# Patient Record
Sex: MALE | Race: WHITE
[De-identification: names, ages, dates, MRNs, and addresses within clinical notes are randomized per-mention and may not be internally consistent; named-entity substitution may affect disease eponyms.]

---

## 2018-01-01 ENCOUNTER — HOSPITAL (OUTPATIENT)
Dept: OTHER | Age: 78
End: 2018-01-01
Attending: FAMILY MEDICINE

## 2018-01-01 LAB
ALBUMIN SERPL-MCNC: 3.8 GM/DL (ref 3.6–5.1)
ALBUMIN/GLOB SERPL: 1.1 {RATIO} (ref 1–2.4)
ALP SERPL-CCNC: 60 UNIT/L (ref 45–117)
ALT SERPL-CCNC: 57 UNIT/L
ANALYZER ANC (IANC): ABNORMAL
ANION GAP SERPL CALC-SCNC: 13 MMOL/L (ref 10–20)
AST SERPL-CCNC: 49 UNIT/L
BASOPHILS # BLD: 0 THOUSAND/MCL (ref 0–0.3)
BASOPHILS NFR BLD: 0 %
BILIRUB SERPL-MCNC: 0.7 MG/DL (ref 0.2–1)
BNP SERPL-MCNC: 179 PG/ML
BUN SERPL-MCNC: 18 MG/DL (ref 6–20)
BUN/CREAT SERPL: 15 (ref 7–25)
CALCIUM SERPL-MCNC: 9 MG/DL (ref 8.4–10.2)
CHLORIDE: 105 MMOL/L (ref 98–107)
CO2 SERPL-SCNC: 30 MMOL/L (ref 21–32)
CREAT SERPL-MCNC: 1.21 MG/DL (ref 0.67–1.17)
DIFFERENTIAL METHOD BLD: ABNORMAL
EOSINOPHIL # BLD: 0.1 THOUSAND/MCL (ref 0.1–0.5)
EOSINOPHIL NFR BLD: 1 %
ERYTHROCYTE [DISTWIDTH] IN BLOOD: 13.3 % (ref 11–15)
GLOBULIN SER-MCNC: 3.5 GM/DL (ref 2–4)
GLUCOSE SERPL-MCNC: 92 MG/DL (ref 65–99)
HEMATOCRIT: 39.7 % (ref 39–51)
HGB BLD-MCNC: 12.9 GM/DL (ref 13–17)
LACTATE BLDV-MCNC: 1.4 MMOL/L
LYMPHOCYTES # BLD: 0.9 THOUSAND/MCL (ref 1–4)
LYMPHOCYTES NFR BLD: 16 %
MAGNESIUM SERPL-MCNC: 1.3 MG/DL (ref 1.7–2.4)
MCH RBC QN AUTO: 32 PG (ref 26–34)
MCHC RBC AUTO-ENTMCNC: 32.5 GM/DL (ref 32–36.5)
MCV RBC AUTO: 98.5 FL (ref 78–100)
MONOCYTES # BLD: 0.6 THOUSAND/MCL (ref 0.3–0.9)
MONOCYTES NFR BLD: 10 %
NEUTROPHILS # BLD: 4 THOUSAND/MCL (ref 1.8–7.7)
NEUTROPHILS NFR BLD: 73 %
NEUTS SEG NFR BLD: ABNORMAL %
PERCENT NRBC: 0
PLATELET # BLD: 84 THOUSAND/MCL (ref 140–450)
POTASSIUM SERPL-SCNC: 3.8 MMOL/L (ref 3.4–5.1)
PROCALCITONIN SERPL IA-MCNC: 0.07 NG/ML
PROT SERPL-MCNC: 7.3 GM/DL (ref 6.4–8.2)
RBC # BLD: 4.03 MILLION/MCL (ref 4.5–5.9)
SODIUM SERPL-SCNC: 144 MMOL/L (ref 135–145)
TROPONIN I SERPL HS-MCNC: <0.02 NG/ML
WBC # BLD: 5.6 THOUSAND/MCL (ref 4.2–11)

## 2018-01-02 ENCOUNTER — DIAGNOSTIC TRANS (OUTPATIENT)
Dept: OTHER | Age: 78
End: 2018-01-02

## 2018-01-02 LAB
ANALYZER ANC (IANC): ABNORMAL
ANION GAP SERPL CALC-SCNC: 10 MMOL/L (ref 10–20)
BASOPHILS # BLD: 0 THOUSAND/MCL (ref 0–0.3)
BASOPHILS NFR BLD: 0 %
BUN SERPL-MCNC: 19 MG/DL (ref 6–20)
BUN/CREAT SERPL: 16 (ref 7–25)
CALCIUM SERPL-MCNC: 8.3 MG/DL (ref 8.4–10.2)
CHLORIDE: 107 MMOL/L (ref 98–107)
CO2 SERPL-SCNC: 30 MMOL/L (ref 21–32)
CREAT SERPL-MCNC: 1.18 MG/DL (ref 0.67–1.17)
DIFFERENTIAL METHOD BLD: ABNORMAL
EOSINOPHIL # BLD: 0 THOUSAND/MCL (ref 0.1–0.5)
EOSINOPHIL NFR BLD: 0 %
ERYTHROCYTE [DISTWIDTH] IN BLOOD: 13.6 % (ref 11–15)
GLUCOSE BLDC GLUCOMTR-MCNC: 160 MG/DL (ref 65–99)
GLUCOSE BLDC GLUCOMTR-MCNC: 190 MG/DL (ref 65–99)
GLUCOSE BLDC GLUCOMTR-MCNC: 198 MG/DL (ref 65–99)
GLUCOSE BLDC GLUCOMTR-MCNC: 290 MG/DL (ref 65–99)
GLUCOSE BLDC GLUCOMTR-MCNC: 90 MG/DL (ref 65–99)
GLUCOSE SERPL-MCNC: 189 MG/DL (ref 65–99)
HEMATOCRIT: 38 % (ref 39–51)
HGB BLD-MCNC: 12.3 GM/DL (ref 13–17)
LYMPHOCYTES # BLD: 0.9 THOUSAND/MCL (ref 1–4)
LYMPHOCYTES NFR BLD: 15 %
MAGNESIUM SERPL-MCNC: 2 MG/DL (ref 1.7–2.4)
MCH RBC QN AUTO: 32.3 PG (ref 26–34)
MCHC RBC AUTO-ENTMCNC: 32.4 GM/DL (ref 32–36.5)
MCV RBC AUTO: 99.7 FL (ref 78–100)
MONOCYTES # BLD: 0.3 THOUSAND/MCL (ref 0.3–0.9)
MONOCYTES NFR BLD: 5 %
NEUTROPHILS # BLD: 4.5 THOUSAND/MCL (ref 1.8–7.7)
NEUTROPHILS NFR BLD: 80 %
NEUTS SEG NFR BLD: ABNORMAL %
PERCENT NRBC: ABNORMAL
PLATELET # BLD: 79 THOUSAND/MCL (ref 140–450)
POTASSIUM SERPL-SCNC: 4.3 MMOL/L (ref 3.4–5.1)
RBC # BLD: 3.81 MILLION/MCL (ref 4.5–5.9)
SODIUM SERPL-SCNC: 143 MMOL/L (ref 135–145)
WBC # BLD: 5.6 THOUSAND/MCL (ref 4.2–11)

## 2018-01-03 LAB
ALBUMIN SERPL-MCNC: 3.1 GM/DL (ref 3.6–5.1)
ALBUMIN/GLOB SERPL: 1 {RATIO} (ref 1–2.4)
ALP SERPL-CCNC: 45 UNIT/L (ref 45–117)
ALT SERPL-CCNC: 60 UNIT/L
AMORPH SED URNS QL MICRO: ABNORMAL
ANALYZER ANC (IANC): ABNORMAL
ANION GAP SERPL CALC-SCNC: 12 MMOL/L (ref 10–20)
APPEARANCE UR: CLEAR
AST SERPL-CCNC: 58 UNIT/L
BACTERIA #/AREA URNS HPF: ABNORMAL /HPF
BASOPHILS # BLD: 0 THOUSAND/MCL (ref 0–0.3)
BASOPHILS NFR BLD: 0 %
BILIRUB SERPL-MCNC: 0.6 MG/DL (ref 0.2–1)
BILIRUB UR QL: NEGATIVE
BUN SERPL-MCNC: 23 MG/DL (ref 6–20)
BUN/CREAT SERPL: 20 (ref 7–25)
CALCIUM SERPL-MCNC: 7.9 MG/DL (ref 8.4–10.2)
CAOX CRY URNS QL MICRO: ABNORMAL
CHLORIDE: 108 MMOL/L (ref 98–107)
CO2 SERPL-SCNC: 27 MMOL/L (ref 21–32)
COLOR UR: YELLOW
CREAT SERPL-MCNC: 1.14 MG/DL (ref 0.67–1.17)
DIFFERENTIAL METHOD BLD: ABNORMAL
EOSINOPHIL # BLD: 0 THOUSAND/MCL (ref 0.1–0.5)
EOSINOPHIL NFR BLD: 1 %
EPITH CASTS #/AREA URNS LPF: ABNORMAL /[LPF]
ERYTHROCYTE [DISTWIDTH] IN BLOOD: 13.9 % (ref 11–15)
FATTY CASTS #/AREA URNS LPF: ABNORMAL /[LPF]
GLOBULIN SER-MCNC: 3.2 GM/DL (ref 2–4)
GLUCOSE BLDC GLUCOMTR-MCNC: 155 MG/DL (ref 65–99)
GLUCOSE BLDC GLUCOMTR-MCNC: 156 MG/DL (ref 65–99)
GLUCOSE BLDC GLUCOMTR-MCNC: 171 MG/DL (ref 65–99)
GLUCOSE BLDC GLUCOMTR-MCNC: 193 MG/DL (ref 65–99)
GLUCOSE BLDC GLUCOMTR-MCNC: 219 MG/DL (ref 65–99)
GLUCOSE SERPL-MCNC: 170 MG/DL (ref 65–99)
GLUCOSE UR-MCNC: ABNORMAL MG/DL
GRAN CASTS #/AREA URNS LPF: ABNORMAL /[LPF]
HEMATOCRIT: 36.4 % (ref 39–51)
HGB BLD-MCNC: 11.7 GM/DL (ref 13–17)
HGB UR QL: NEGATIVE
HYALINE CASTS #/AREA URNS LPF: ABNORMAL /LPF (ref 0–5)
KETONES UR-MCNC: NEGATIVE MG/DL
LEUKOCYTE ESTERASE UR QL STRIP: NEGATIVE
LYMPHOCYTES # BLD: 1.7 THOUSAND/MCL (ref 1–4)
LYMPHOCYTES NFR BLD: 24 %
MAGNESIUM SERPL-MCNC: 1.8 MG/DL (ref 1.7–2.4)
MCH RBC QN AUTO: 32.3 PG (ref 26–34)
MCHC RBC AUTO-ENTMCNC: 32.1 GM/DL (ref 32–36.5)
MCV RBC AUTO: 100.6 FL (ref 78–100)
MIXED CELL CASTS #/AREA URNS LPF: ABNORMAL /[LPF]
MONOCYTES # BLD: 0.8 THOUSAND/MCL (ref 0.3–0.9)
MONOCYTES NFR BLD: 10 %
MUCOUS THREADS URNS QL MICRO: ABNORMAL
NEUTROPHILS # BLD: 4.8 THOUSAND/MCL (ref 1.8–7.7)
NEUTROPHILS NFR BLD: 65 %
NEUTS SEG NFR BLD: ABNORMAL %
NITRITE UR QL: NEGATIVE
PERCENT NRBC: ABNORMAL
PH UR: 5 UNIT (ref 5–7)
PLATELET # BLD: 86 THOUSAND/MCL (ref 140–450)
POTASSIUM SERPL-SCNC: 3.6 MMOL/L (ref 3.4–5.1)
PROT SERPL-MCNC: 6.3 GM/DL (ref 6.4–8.2)
PROT UR QL: NEGATIVE MG/DL
RBC # BLD: 3.62 MILLION/MCL (ref 4.5–5.9)
RBC #/AREA URNS HPF: ABNORMAL /HPF (ref 0–3)
RBC CASTS #/AREA URNS LPF: ABNORMAL /[LPF]
RENAL EPI CELLS #/AREA URNS HPF: ABNORMAL /[HPF]
SODIUM SERPL-SCNC: 143 MMOL/L (ref 135–145)
SP GR UR: 1.02 (ref 1–1.03)
SPECIMEN SOURCE: ABNORMAL
SPERM URNS QL MICRO: ABNORMAL
SQUAMOUS #/AREA URNS HPF: ABNORMAL /HPF (ref 0–5)
T VAGINALIS URNS QL MICRO: ABNORMAL
TRI-PHOS CRY URNS QL MICRO: ABNORMAL
URATE CRY URNS QL MICRO: ABNORMAL
URINE REFLEX: ABNORMAL
URNS CMNT MICRO: ABNORMAL
UROBILINOGEN UR QL: 0.2 MG/DL (ref 0–1)
WAXY CASTS #/AREA URNS LPF: ABNORMAL /[LPF]
WBC # BLD: 7.3 THOUSAND/MCL (ref 4.2–11)
WBC #/AREA URNS HPF: ABNORMAL /HPF (ref 0–5)
WBC CASTS #/AREA URNS LPF: ABNORMAL /[LPF]
YEAST HYPHAE URNS QL MICRO: ABNORMAL
YEAST URNS QL MICRO: ABNORMAL

## 2018-01-04 LAB
ANALYZER ANC (IANC): ABNORMAL
ANION GAP SERPL CALC-SCNC: 12 MMOL/L (ref 10–20)
BASOPHILS # BLD: 0 THOUSAND/MCL (ref 0–0.3)
BASOPHILS NFR BLD: 0 %
BUN SERPL-MCNC: 19 MG/DL (ref 6–20)
BUN/CREAT SERPL: 18 (ref 7–25)
CALCIUM SERPL-MCNC: 7.9 MG/DL (ref 8.4–10.2)
CHLORIDE: 109 MMOL/L (ref 98–107)
CO2 SERPL-SCNC: 27 MMOL/L (ref 21–32)
CREAT SERPL-MCNC: 1.05 MG/DL (ref 0.67–1.17)
DIFFERENTIAL METHOD BLD: ABNORMAL
EOSINOPHIL # BLD: 0.1 THOUSAND/MCL (ref 0.1–0.5)
EOSINOPHIL NFR BLD: 1 %
ERYTHROCYTE [DISTWIDTH] IN BLOOD: 14.1 % (ref 11–15)
GLUCOSE BLDC GLUCOMTR-MCNC: 123 MG/DL (ref 65–99)
GLUCOSE BLDC GLUCOMTR-MCNC: 154 MG/DL (ref 65–99)
GLUCOSE BLDC GLUCOMTR-MCNC: 154 MG/DL (ref 65–99)
GLUCOSE BLDC GLUCOMTR-MCNC: 156 MG/DL (ref 65–99)
GLUCOSE BLDC GLUCOMTR-MCNC: 162 MG/DL (ref 65–99)
GLUCOSE SERPL-MCNC: 148 MG/DL (ref 65–99)
HEMATOCRIT: 36.9 % (ref 39–51)
HGB BLD-MCNC: 11.6 GM/DL (ref 13–17)
LYMPHOCYTES # BLD: 1.5 THOUSAND/MCL (ref 1–4)
LYMPHOCYTES NFR BLD: 19 %
MCH RBC QN AUTO: 32 PG (ref 26–34)
MCHC RBC AUTO-ENTMCNC: 31.4 GM/DL (ref 32–36.5)
MCV RBC AUTO: 101.7 FL (ref 78–100)
MONOCYTES # BLD: 0.9 THOUSAND/MCL (ref 0.3–0.9)
MONOCYTES NFR BLD: 11 %
NEUTROPHILS # BLD: 5.4 THOUSAND/MCL (ref 1.8–7.7)
NEUTROPHILS NFR BLD: 69 %
NEUTS SEG NFR BLD: ABNORMAL %
PERCENT NRBC: ABNORMAL
PLATELET # BLD: 102 THOUSAND/MCL (ref 140–450)
POTASSIUM SERPL-SCNC: 4.4 MMOL/L (ref 3.4–5.1)
RBC # BLD: 3.63 MILLION/MCL (ref 4.5–5.9)
SODIUM SERPL-SCNC: 144 MMOL/L (ref 135–145)
WBC # BLD: 7.9 THOUSAND/MCL (ref 4.2–11)

## 2018-01-05 LAB
ALBUMIN SERPL-MCNC: 3 GM/DL (ref 3.6–5.1)
ALBUMIN/GLOB SERPL: 0.8 {RATIO} (ref 1–2.4)
ALP SERPL-CCNC: 44 UNIT/L (ref 45–117)
ALT SERPL-CCNC: 40 UNIT/L
ANALYZER ANC (IANC): ABNORMAL
ANION GAP SERPL CALC-SCNC: 14 MMOL/L (ref 10–20)
AST SERPL-CCNC: 33 UNIT/L
BASOPHILS # BLD: 0 THOUSAND/MCL (ref 0–0.3)
BASOPHILS NFR BLD: 0 %
BILIRUB SERPL-MCNC: 1.1 MG/DL (ref 0.2–1)
BUN SERPL-MCNC: 18 MG/DL (ref 6–20)
BUN/CREAT SERPL: 19 (ref 7–25)
CALCIUM SERPL-MCNC: 8.2 MG/DL (ref 8.4–10.2)
CHLORIDE: 110 MMOL/L (ref 98–107)
CO2 SERPL-SCNC: 25 MMOL/L (ref 21–32)
CREAT SERPL-MCNC: 0.97 MG/DL (ref 0.67–1.17)
DIFFERENTIAL METHOD BLD: ABNORMAL
EOSINOPHIL # BLD: 0 THOUSAND/MCL (ref 0.1–0.5)
EOSINOPHIL NFR BLD: 0 %
ERYTHROCYTE [DISTWIDTH] IN BLOOD: 14.1 % (ref 11–15)
GLOBULIN SER-MCNC: 3.6 GM/DL (ref 2–4)
GLUCOSE BLDC GLUCOMTR-MCNC: 116 MG/DL (ref 65–99)
GLUCOSE BLDC GLUCOMTR-MCNC: 126 MG/DL (ref 65–99)
GLUCOSE BLDC GLUCOMTR-MCNC: 142 MG/DL (ref 65–99)
GLUCOSE BLDC GLUCOMTR-MCNC: 160 MG/DL (ref 65–99)
GLUCOSE SERPL-MCNC: 139 MG/DL (ref 65–99)
HEMATOCRIT: 35.8 % (ref 39–51)
HGB BLD-MCNC: 11.3 GM/DL (ref 13–17)
LYMPHOCYTES # BLD: 1.4 THOUSAND/MCL (ref 1–4)
LYMPHOCYTES NFR BLD: 19 %
MAGNESIUM SERPL-MCNC: 1.7 MG/DL (ref 1.7–2.4)
MCH RBC QN AUTO: 32.1 PG (ref 26–34)
MCHC RBC AUTO-ENTMCNC: 31.6 GM/DL (ref 32–36.5)
MCV RBC AUTO: 101.7 FL (ref 78–100)
MONOCYTES # BLD: 0.8 THOUSAND/MCL (ref 0.3–0.9)
MONOCYTES NFR BLD: 11 %
NEUTROPHILS # BLD: 5.3 THOUSAND/MCL (ref 1.8–7.7)
NEUTROPHILS NFR BLD: 70 %
NEUTS SEG NFR BLD: ABNORMAL %
PERCENT NRBC: ABNORMAL
PLATELET # BLD: 91 THOUSAND/MCL (ref 140–450)
POTASSIUM SERPL-SCNC: 4 MMOL/L (ref 3.4–5.1)
PROT SERPL-MCNC: 6.6 GM/DL (ref 6.4–8.2)
RBC # BLD: 3.52 MILLION/MCL (ref 4.5–5.9)
SODIUM SERPL-SCNC: 145 MMOL/L (ref 135–145)
WBC # BLD: 7.7 THOUSAND/MCL (ref 4.2–11)

## 2018-01-06 LAB
CREAT SERPL-MCNC: 0.85 MG/DL (ref 0.67–1.17)
GLUCOSE BLDC GLUCOMTR-MCNC: 202 MG/DL (ref 65–99)
GLUCOSE BLDC GLUCOMTR-MCNC: 253 MG/DL (ref 65–99)
GLUCOSE BLDC GLUCOMTR-MCNC: 255 MG/DL (ref 65–99)
GLUCOSE BLDC GLUCOMTR-MCNC: 261 MG/DL (ref 65–99)
GLUCOSE BLDC GLUCOMTR-MCNC: 331 MG/DL (ref 65–99)
MAGNESIUM SERPL-MCNC: 1.9 MG/DL (ref 1.7–2.4)

## 2018-01-07 LAB
ANALYZER ANC (IANC): ABNORMAL
BASOPHILS # BLD: 0 THOUSAND/MCL (ref 0–0.3)
BASOPHILS NFR BLD: 0 %
DIFFERENTIAL METHOD BLD: ABNORMAL
EOSINOPHIL # BLD: 0 THOUSAND/MCL (ref 0.1–0.5)
EOSINOPHIL NFR BLD: 0 %
ERYTHROCYTE [DISTWIDTH] IN BLOOD: 13.8 % (ref 11–15)
GLUCOSE BLDC GLUCOMTR-MCNC: 253 MG/DL (ref 65–99)
GLUCOSE BLDC GLUCOMTR-MCNC: 267 MG/DL (ref 65–99)
HEMATOCRIT: 35.3 % (ref 39–51)
HGB BLD-MCNC: 11.4 GM/DL (ref 13–17)
LYMPHOCYTES # BLD: 0.8 THOUSAND/MCL (ref 1–4)
LYMPHOCYTES NFR BLD: 12 %
MCH RBC QN AUTO: 32.3 PG (ref 26–34)
MCHC RBC AUTO-ENTMCNC: 32.3 GM/DL (ref 32–36.5)
MCV RBC AUTO: 100 FL (ref 78–100)
MONOCYTES # BLD: 0.5 THOUSAND/MCL (ref 0.3–0.9)
MONOCYTES NFR BLD: 9 %
NEUTROPHILS # BLD: 5 THOUSAND/MCL (ref 1.8–7.7)
NEUTROPHILS NFR BLD: 79 %
NEUTS SEG NFR BLD: ABNORMAL %
PERCENT NRBC: ABNORMAL
PLATELET # BLD: 109 THOUSAND/MCL (ref 140–450)
RBC # BLD: 3.53 MILLION/MCL (ref 4.5–5.9)
WBC # BLD: 6.3 THOUSAND/MCL (ref 4.2–11)

## 2018-04-07 LAB
ALBUMIN SERPL-MCNC: 3.6 GM/DL (ref 3.6–5.1)
ALP SERPL-CCNC: 72 UNIT/L (ref 45–117)
ALT SERPL-CCNC: 44 UNIT/L
AMORPH SED URNS QL MICRO: NORMAL
ANALYZER ANC (IANC): ABNORMAL
ANION GAP SERPL CALC-SCNC: 12 MMOL/L (ref 10–20)
APPEARANCE UR: CLEAR
AST SERPL-CCNC: 29 UNIT/L
BACTERIA #/AREA URNS HPF: NORMAL /HPF
BASOPHILS # BLD: 0 THOUSAND/MCL (ref 0–0.3)
BASOPHILS NFR BLD: 0 %
BILIRUB CONJ SERPL-MCNC: 0.1 MG/DL (ref 0–0.2)
BILIRUB SERPL-MCNC: 0.5 MG/DL (ref 0.2–1)
BILIRUB UR QL: NEGATIVE
BUN SERPL-MCNC: 14 MG/DL (ref 6–20)
BUN/CREAT SERPL: 14 (ref 7–25)
CALCIUM SERPL-MCNC: 8.7 MG/DL (ref 8.4–10.2)
CAOX CRY URNS QL MICRO: NORMAL
CHLORIDE: 104 MMOL/L (ref 98–107)
CO2 SERPL-SCNC: 30 MMOL/L (ref 21–32)
COLOR UR: YELLOW
CREAT SERPL-MCNC: 1.02 MG/DL (ref 0.67–1.17)
DIFFERENTIAL METHOD BLD: ABNORMAL
EOSINOPHIL # BLD: 0.1 THOUSAND/MCL (ref 0.1–0.5)
EOSINOPHIL NFR BLD: 1 %
EPITH CASTS #/AREA URNS LPF: NORMAL /[LPF]
ERYTHROCYTE [DISTWIDTH] IN BLOOD: 13.6 % (ref 11–15)
FATTY CASTS #/AREA URNS LPF: NORMAL /[LPF]
GLUCOSE SERPL-MCNC: 173 MG/DL (ref 65–99)
GLUCOSE UR-MCNC: NEGATIVE MG/DL
GRAN CASTS #/AREA URNS LPF: NORMAL /[LPF]
HEMATOCRIT: 39.5 % (ref 39–51)
HGB BLD-MCNC: 12.8 GM/DL (ref 13–17)
HGB UR QL: NEGATIVE
HYALINE CASTS #/AREA URNS LPF: NORMAL /LPF (ref 0–5)
INR PPP: 1.1
KETONES UR-MCNC: NEGATIVE MG/DL
LACTATE BLDV-MCNC: 1.3 MMOL/L
LEUKOCYTE ESTERASE UR QL STRIP: NEGATIVE
LYMPHOCYTES # BLD: 1.5 THOUSAND/MCL (ref 1–4)
LYMPHOCYTES NFR BLD: 16 %
MCH RBC QN AUTO: 31.2 PG (ref 26–34)
MCHC RBC AUTO-ENTMCNC: 32.4 GM/DL (ref 32–36.5)
MCV RBC AUTO: 96.3 FL (ref 78–100)
MIXED CELL CASTS #/AREA URNS LPF: NORMAL /[LPF]
MONOCYTES # BLD: 0.8 THOUSAND/MCL (ref 0.3–0.9)
MONOCYTES NFR BLD: 8 %
MUCOUS THREADS URNS QL MICRO: NORMAL
NEUTROPHILS # BLD: 6.9 THOUSAND/MCL (ref 1.8–7.7)
NEUTROPHILS NFR BLD: 75 %
NEUTS SEG NFR BLD: ABNORMAL %
NITRITE UR QL: NEGATIVE
PERCENT NRBC: ABNORMAL
PH UR: 6 UNIT (ref 5–7)
PLATELET # BLD: 103 THOUSAND/MCL (ref 140–450)
POTASSIUM SERPL-SCNC: 3.9 MMOL/L (ref 3.4–5.1)
PROCALCITONIN SERPL IA-MCNC: <0.05 NG/ML
PROT SERPL-MCNC: 7.2 GM/DL (ref 6.4–8.2)
PROT UR QL: NEGATIVE MG/DL
PROTHROMBIN TIME: 11.4 SECONDS (ref 9.7–11.8)
PROTHROMBIN TIME: NORMAL
RBC # BLD: 4.1 MILLION/MCL (ref 4.5–5.9)
RBC #/AREA URNS HPF: NORMAL /HPF (ref 0–3)
RBC CASTS #/AREA URNS LPF: NORMAL /[LPF]
RENAL EPI CELLS #/AREA URNS HPF: NORMAL /[HPF]
SODIUM SERPL-SCNC: 142 MMOL/L (ref 135–145)
SP GR UR: 1.01 (ref 1–1.03)
SPECIMEN SOURCE: NORMAL
SPERM URNS QL MICRO: NORMAL
SQUAMOUS #/AREA URNS HPF: NORMAL /HPF (ref 0–5)
T VAGINALIS URNS QL MICRO: NORMAL
TRI-PHOS CRY URNS QL MICRO: NORMAL
TROPONIN I SERPL HS-MCNC: <0.02 NG/ML
URATE CRY URNS QL MICRO: NORMAL
URINE REFLEX: NORMAL
URNS CMNT MICRO: NORMAL
UROBILINOGEN UR QL: 0.2 MG/DL (ref 0–1)
WAXY CASTS #/AREA URNS LPF: NORMAL /[LPF]
WBC # BLD: 9.2 THOUSAND/MCL (ref 4.2–11)
WBC #/AREA URNS HPF: NORMAL /HPF (ref 0–5)
WBC CASTS #/AREA URNS LPF: NORMAL /[LPF]
YEAST HYPHAE URNS QL MICRO: NORMAL
YEAST URNS QL MICRO: NORMAL

## 2018-04-08 ENCOUNTER — HOSPITAL (OUTPATIENT)
Dept: OTHER | Age: 78
End: 2018-04-08
Attending: INTERNAL MEDICINE

## 2018-04-08 ENCOUNTER — DIAGNOSTIC TRANS (OUTPATIENT)
Dept: OTHER | Age: 78
End: 2018-04-08

## 2018-04-08 LAB
ALBUMIN SERPL-MCNC: 3.6 GM/DL (ref 3.6–5.1)
ALP SERPL-CCNC: 72 UNIT/L (ref 45–117)
ALT SERPL-CCNC: 46 UNIT/L
ANALYZER ANC (IANC): ABNORMAL
ANION GAP SERPL CALC-SCNC: 11 MMOL/L (ref 10–20)
AST SERPL-CCNC: 29 UNIT/L
BASOPHILS # BLD: 0 THOUSAND/MCL (ref 0–0.3)
BASOPHILS NFR BLD: 0 %
BILIRUB CONJ SERPL-MCNC: 0.2 MG/DL (ref 0–0.2)
BILIRUB SERPL-MCNC: 0.7 MG/DL (ref 0.2–1)
BUN SERPL-MCNC: 14 MG/DL (ref 6–20)
BUN/CREAT SERPL: 14 (ref 7–25)
CALCIUM SERPL-MCNC: 9 MG/DL (ref 8.4–10.2)
CHLORIDE: 105 MMOL/L (ref 98–107)
CO2 SERPL-SCNC: 29 MMOL/L (ref 21–32)
CREAT SERPL-MCNC: 0.98 MG/DL (ref 0.67–1.17)
DIFFERENTIAL METHOD BLD: ABNORMAL
EOSINOPHIL # BLD: 0 THOUSAND/MCL (ref 0.1–0.5)
EOSINOPHIL NFR BLD: 0 %
ERYTHROCYTE [DISTWIDTH] IN BLOOD: 13.7 % (ref 11–15)
GLUCOSE BLDC GLUCOMTR-MCNC: 251 MG/DL (ref 65–99)
GLUCOSE BLDC GLUCOMTR-MCNC: 258 MG/DL (ref 65–99)
GLUCOSE BLDC GLUCOMTR-MCNC: 262 MG/DL (ref 65–99)
GLUCOSE BLDC GLUCOMTR-MCNC: 264 MG/DL (ref 65–99)
GLUCOSE BLDC GLUCOMTR-MCNC: 286 MG/DL (ref 65–99)
GLUCOSE SERPL-MCNC: 232 MG/DL (ref 65–99)
HEMATOCRIT: 41.7 % (ref 39–51)
HGB BLD-MCNC: 13.5 GM/DL (ref 13–17)
LYMPHOCYTES # BLD: 1.4 THOUSAND/MCL (ref 1–4)
LYMPHOCYTES NFR BLD: 18 %
MCH RBC QN AUTO: 31.4 PG (ref 26–34)
MCHC RBC AUTO-ENTMCNC: 32.4 GM/DL (ref 32–36.5)
MCV RBC AUTO: 97 FL (ref 78–100)
MONOCYTES # BLD: 0.4 THOUSAND/MCL (ref 0.3–0.9)
MONOCYTES NFR BLD: 5 %
NEUTROPHILS # BLD: 6.3 THOUSAND/MCL (ref 1.8–7.7)
NEUTROPHILS NFR BLD: 77 %
NEUTS SEG NFR BLD: ABNORMAL %
PERCENT NRBC: ABNORMAL
PLATELET # BLD: 107 THOUSAND/MCL (ref 140–450)
POTASSIUM SERPL-SCNC: 4.1 MMOL/L (ref 3.4–5.1)
PROT SERPL-MCNC: 7.3 GM/DL (ref 6.4–8.2)
RBC # BLD: 4.3 MILLION/MCL (ref 4.5–5.9)
SODIUM SERPL-SCNC: 141 MMOL/L (ref 135–145)
TROPONIN I SERPL HS-MCNC: <0.02 NG/ML
WBC # BLD: 8.2 THOUSAND/MCL (ref 4.2–11)

## 2018-04-09 ENCOUNTER — IMAGING SERVICES (OUTPATIENT)
Dept: OTHER | Age: 78
End: 2018-04-09

## 2018-04-09 ENCOUNTER — CHARTING TRANS (OUTPATIENT)
Dept: OTHER | Age: 78
End: 2018-04-09

## 2018-04-09 LAB
ANALYZER ANC (IANC): ABNORMAL
ANION GAP SERPL CALC-SCNC: 12 MMOL/L (ref 10–20)
ANION GAP SERPL CALC-SCNC: 12 MMOL/L (ref 10–20)
BASOPHILS # BLD: 0 THOUSAND/MCL (ref 0–0.3)
BASOPHILS NFR BLD: 0 %
BUN SERPL-MCNC: 20 MG/DL (ref 6–20)
BUN SERPL-MCNC: 24 MG/DL (ref 6–20)
BUN/CREAT SERPL: 26 (ref 7–25)
BUN/CREAT SERPL: 29 (ref 7–25)
CALCIUM SERPL-MCNC: 9 MG/DL (ref 8.4–10.2)
CALCIUM SERPL-MCNC: 9.5 MG/DL (ref 8.4–10.2)
CHLORIDE: 101 MMOL/L (ref 98–107)
CHLORIDE: 104 MMOL/L (ref 98–107)
CO2 SERPL-SCNC: 29 MMOL/L (ref 21–32)
CO2 SERPL-SCNC: 29 MMOL/L (ref 21–32)
CREAT SERPL-MCNC: 0.78 MG/DL (ref 0.67–1.17)
CREAT SERPL-MCNC: 0.83 MG/DL (ref 0.67–1.17)
DIFFERENTIAL METHOD BLD: ABNORMAL
EOSINOPHIL # BLD: 0 THOUSAND/MCL (ref 0.1–0.5)
EOSINOPHIL NFR BLD: 0 %
ERYTHROCYTE [DISTWIDTH] IN BLOOD: 13.6 % (ref 11–15)
GLUCOSE BLDC GLUCOMTR-MCNC: 197 MG/DL (ref 65–99)
GLUCOSE BLDC GLUCOMTR-MCNC: 225 MG/DL (ref 65–99)
GLUCOSE BLDC GLUCOMTR-MCNC: 235 MG/DL (ref 65–99)
GLUCOSE BLDC GLUCOMTR-MCNC: 294 MG/DL (ref 65–99)
GLUCOSE SERPL-MCNC: 267 MG/DL (ref 65–99)
GLUCOSE SERPL-MCNC: 291 MG/DL (ref 65–99)
HEMATOCRIT: 40.4 % (ref 39–51)
HGB BLD-MCNC: 13.3 GM/DL (ref 13–17)
LYMPHOCYTES # BLD: 1.4 THOUSAND/MCL (ref 1–4)
LYMPHOCYTES NFR BLD: 14 %
MAGNESIUM SERPL-MCNC: 1.9 MG/DL (ref 1.7–2.4)
MAGNESIUM SERPL-MCNC: 1.9 MG/DL (ref 1.7–2.4)
MCH RBC QN AUTO: 31.4 PG (ref 26–34)
MCHC RBC AUTO-ENTMCNC: 32.9 GM/DL (ref 32–36.5)
MCV RBC AUTO: 95.5 FL (ref 78–100)
MONOCYTES # BLD: 0.5 THOUSAND/MCL (ref 0.3–0.9)
MONOCYTES NFR BLD: 5 %
NEUTROPHILS # BLD: 8 THOUSAND/MCL (ref 1.8–7.7)
NEUTROPHILS NFR BLD: 81 %
NEUTS SEG NFR BLD: ABNORMAL %
PERCENT NRBC: ABNORMAL
PHOSPHATE SERPL-MCNC: 3 MG/DL (ref 2.4–4.7)
PLATELET # BLD: 118 THOUSAND/MCL (ref 140–450)
POTASSIUM SERPL-SCNC: 4 MMOL/L (ref 3.4–5.1)
POTASSIUM SERPL-SCNC: 4.2 MMOL/L (ref 3.4–5.1)
RBC # BLD: 4.23 MILLION/MCL (ref 4.5–5.9)
SODIUM SERPL-SCNC: 138 MMOL/L (ref 135–145)
SODIUM SERPL-SCNC: 141 MMOL/L (ref 135–145)
WBC # BLD: 10 THOUSAND/MCL (ref 4.2–11)

## 2018-04-10 ENCOUNTER — IMAGING SERVICES (OUTPATIENT)
Dept: OTHER | Age: 78
End: 2018-04-10

## 2018-04-10 LAB
ANALYZER ANC (IANC): ABNORMAL
ANION GAP SERPL CALC-SCNC: 9 MMOL/L (ref 10–20)
BASOPHILS # BLD: 0 THOUSAND/MCL (ref 0–0.3)
BASOPHILS NFR BLD: 0 %
BUN SERPL-MCNC: 24 MG/DL (ref 6–20)
BUN/CREAT SERPL: 30 (ref 7–25)
CALCIUM SERPL-MCNC: 9.2 MG/DL (ref 8.4–10.2)
CHLORIDE: 103 MMOL/L (ref 98–107)
CO2 SERPL-SCNC: 33 MMOL/L (ref 21–32)
CREAT SERPL-MCNC: 0.8 MG/DL (ref 0.67–1.17)
DIFFERENTIAL METHOD BLD: ABNORMAL
EOSINOPHIL # BLD: 0 THOUSAND/MCL (ref 0.1–0.5)
EOSINOPHIL NFR BLD: 0 %
ERYTHROCYTE [DISTWIDTH] IN BLOOD: 13.7 % (ref 11–15)
GLUCOSE BLDC GLUCOMTR-MCNC: 185 MG/DL (ref 65–99)
GLUCOSE BLDC GLUCOMTR-MCNC: 204 MG/DL (ref 65–99)
GLUCOSE BLDC GLUCOMTR-MCNC: 205 MG/DL (ref 65–99)
GLUCOSE BLDC GLUCOMTR-MCNC: 280 MG/DL (ref 65–99)
GLUCOSE BLDC GLUCOMTR-MCNC: 302 MG/DL (ref 65–99)
GLUCOSE SERPL-MCNC: 176 MG/DL (ref 65–99)
HEMATOCRIT: 46.5 % (ref 39–51)
HGB BLD-MCNC: 15.2 GM/DL (ref 13–17)
LYMPHOCYTES # BLD: 1.8 THOUSAND/MCL (ref 1–4)
LYMPHOCYTES NFR BLD: 15 %
MCH RBC QN AUTO: 31.6 PG (ref 26–34)
MCHC RBC AUTO-ENTMCNC: 32.7 GM/DL (ref 32–36.5)
MCV RBC AUTO: 96.7 FL (ref 78–100)
MONOCYTES # BLD: 0.9 THOUSAND/MCL (ref 0.3–0.9)
MONOCYTES NFR BLD: 8 %
NEUTROPHILS # BLD: 9.3 THOUSAND/MCL (ref 1.8–7.7)
NEUTROPHILS NFR BLD: 77 %
NEUTS SEG NFR BLD: ABNORMAL %
PERCENT NRBC: ABNORMAL
PLATELET # BLD: 151 THOUSAND/MCL (ref 140–450)
POTASSIUM SERPL-SCNC: 3.9 MMOL/L (ref 3.4–5.1)
RBC # BLD: 4.81 MILLION/MCL (ref 4.5–5.9)
SODIUM SERPL-SCNC: 141 MMOL/L (ref 135–145)
WBC # BLD: 12.1 THOUSAND/MCL (ref 4.2–11)

## 2018-04-11 LAB
ANALYZER ANC (IANC): ABNORMAL
ANION GAP SERPL CALC-SCNC: 12 MMOL/L (ref 10–20)
BASOPHILS # BLD: 0 THOUSAND/MCL (ref 0–0.3)
BASOPHILS NFR BLD: 0 %
BUN SERPL-MCNC: 29 MG/DL (ref 6–20)
BUN/CREAT SERPL: 33 (ref 7–25)
CALCIUM SERPL-MCNC: 9.1 MG/DL (ref 8.4–10.2)
CHLORIDE: 104 MMOL/L (ref 98–107)
CO2 SERPL-SCNC: 32 MMOL/L (ref 21–32)
CREAT SERPL-MCNC: 0.88 MG/DL (ref 0.67–1.17)
DIFFERENTIAL METHOD BLD: ABNORMAL
EOSINOPHIL # BLD: 0 THOUSAND/MCL (ref 0.1–0.5)
EOSINOPHIL NFR BLD: 0 %
ERYTHROCYTE [DISTWIDTH] IN BLOOD: 13.7 % (ref 11–15)
GLUCOSE BLDC GLUCOMTR-MCNC: 101 MG/DL (ref 65–99)
GLUCOSE BLDC GLUCOMTR-MCNC: 146 MG/DL (ref 65–99)
GLUCOSE BLDC GLUCOMTR-MCNC: 160 MG/DL (ref 65–99)
GLUCOSE SERPL-MCNC: 129 MG/DL (ref 65–99)
HEMATOCRIT: 44.3 % (ref 39–51)
HGB BLD-MCNC: 14.2 GM/DL (ref 13–17)
LYMPHOCYTES # BLD: 3.1 THOUSAND/MCL (ref 1–4)
LYMPHOCYTES NFR BLD: 30 %
MCH RBC QN AUTO: 30.9 PG (ref 26–34)
MCHC RBC AUTO-ENTMCNC: 32.1 GM/DL (ref 32–36.5)
MCV RBC AUTO: 96.3 FL (ref 78–100)
MONOCYTES # BLD: 1.3 THOUSAND/MCL (ref 0.3–0.9)
MONOCYTES NFR BLD: 13 %
NEUTROPHILS # BLD: 5.8 THOUSAND/MCL (ref 1.8–7.7)
NEUTROPHILS NFR BLD: 57 %
NEUTS SEG NFR BLD: ABNORMAL %
PERCENT NRBC: ABNORMAL
PLATELET # BLD: 126 THOUSAND/MCL (ref 140–450)
POTASSIUM SERPL-SCNC: 4.5 MMOL/L (ref 3.4–5.1)
RBC # BLD: 4.6 MILLION/MCL (ref 4.5–5.9)
SODIUM SERPL-SCNC: 143 MMOL/L (ref 135–145)
WBC # BLD: 10.2 THOUSAND/MCL (ref 4.2–11)

## 2019-12-03 ENCOUNTER — HOSPITAL (OUTPATIENT)
Dept: OTHER | Age: 79
End: 2019-12-03

## 2019-12-03 ENCOUNTER — DIAGNOSTIC TRANS (OUTPATIENT)
Dept: OTHER | Age: 79
End: 2019-12-03

## 2019-12-03 LAB
AMORPH SED URNS QL MICRO: NORMAL
ANALYZER ANC (IANC): ABNORMAL
APPEARANCE UR: CLEAR
APTT PPP: 23 SEC (ref 22–32)
APTT PPP: NORMAL S
APTT PPP: NORMAL S
BACTERIA #/AREA URNS HPF: NORMAL /HPF
BASOPHILS # BLD: 0 K/MCL (ref 0–0.3)
BASOPHILS NFR BLD: 0 %
BILIRUB UR QL: NEGATIVE
CAOX CRY URNS QL MICRO: NORMAL
COLOR UR: YELLOW
DIFFERENTIAL METHOD BLD: ABNORMAL
EOSINOPHIL # BLD: 0.1 K/MCL (ref 0.1–0.5)
EOSINOPHIL NFR BLD: 1 %
EPITH CASTS #/AREA URNS LPF: NORMAL /[LPF]
ERYTHROCYTE [DISTWIDTH] IN BLOOD: 13.2 % (ref 11–15)
FATTY CASTS #/AREA URNS LPF: NORMAL /[LPF]
GLUCOSE UR-MCNC: NEGATIVE MG/DL
GRAN CASTS #/AREA URNS LPF: NORMAL /[LPF]
HCT VFR BLD CALC: 42.7 % (ref 39–51)
HGB BLD-MCNC: 13.1 G/DL (ref 13–17)
HGB UR QL: NEGATIVE
HYALINE CASTS #/AREA URNS LPF: NORMAL /LPF (ref 0–5)
IMM GRANULOCYTES # BLD AUTO: 0 K/MCL (ref 0–0.2)
IMM GRANULOCYTES NFR BLD: 0 %
INFLUENZA A VIRUS (XFLUA): NOT DETECTED
INFLUENZA B VIRUS (XFLUB): NORMAL
INFLUENZA B VIRUS (XFLUB): NOT DETECTED
INR PPP: 1.1
INR PPP: NORMAL
KETONES UR-MCNC: NEGATIVE MG/DL
LACTATE BLDV-MCNC: 1.1 MMOL/L
LEUKOCYTE ESTERASE UR QL STRIP: NEGATIVE
LYMPHOCYTES # BLD: 1.7 K/MCL (ref 1–4)
LYMPHOCYTES NFR BLD: 19 %
MCH RBC QN AUTO: 30.5 PG (ref 26–34)
MCHC RBC AUTO-ENTMCNC: 30.7 G/DL (ref 32–36.5)
MCV RBC AUTO: 99.5 FL (ref 78–100)
MIXED CELL CASTS #/AREA URNS LPF: NORMAL /[LPF]
MONOCYTES # BLD: 0.7 K/MCL (ref 0.3–0.9)
MONOCYTES NFR BLD: 8 %
MUCOUS THREADS URNS QL MICRO: PRESENT
NEUTROPHILS # BLD: 6.2 K/MCL (ref 1.8–7.7)
NEUTROPHILS NFR BLD: 72 %
NEUTS SEG NFR BLD: ABNORMAL %
NITRITE UR QL: NEGATIVE
NRBC (NRBCRE): 0 /100 WBC
PH UR: 5 UNITS (ref 5–7)
PLATELET # BLD: 104 K/MCL (ref 140–450)
PROT UR QL: NEGATIVE MG/DL
PROTHROMBIN TIME (PRT2): NORMAL
PROTHROMBIN TIME: 11.2 SEC (ref 9.7–11.8)
RBC # BLD: 4.29 MIL/MCL (ref 4.5–5.9)
RBC #/AREA URNS HPF: NORMAL /HPF (ref 0–2)
RBC CASTS #/AREA URNS LPF: NORMAL /[LPF]
RENAL EPI CELLS #/AREA URNS HPF: NORMAL /[HPF]
SP GR UR: 1.02 (ref 1–1.03)
SPECIMEN SOURCE (FLUSC): NORMAL
SPECIMEN SOURCE: NORMAL
SPERM URNS QL MICRO: NORMAL
SQUAMOUS #/AREA URNS HPF: NORMAL /HPF (ref 0–5)
T VAGINALIS URNS QL MICRO: NORMAL
TRI-PHOS CRY URNS QL MICRO: NORMAL
URATE CRY URNS QL MICRO: NORMAL
URINE REFLEX: NORMAL
URNS CMNT MICRO: NORMAL
UROBILINOGEN UR QL: 0.2 MG/DL (ref 0–1)
WAXY CASTS #/AREA URNS LPF: NORMAL /[LPF]
WBC # BLD: 8.7 K/MCL (ref 4.2–11)
WBC #/AREA URNS HPF: NORMAL /HPF (ref 0–5)
WBC CASTS #/AREA URNS LPF: NORMAL /[LPF]
YEAST HYPHAE URNS QL MICRO: NORMAL
YEAST URNS QL MICRO: NORMAL

## 2019-12-04 LAB
2009 H1N1 SUBTYPE (RF1N1): NOT DETECTED
ADENOVIRUS (RADENO): NOT DETECTED
ALBUMIN SERPL-MCNC: 3.4 G/DL (ref 3.6–5.1)
ALBUMIN/GLOB SERPL: 1.1 {RATIO} (ref 1–2.4)
ALP SERPL-CCNC: 68 UNITS/L (ref 45–117)
ALT SERPL-CCNC: 39 UNITS/L
ANALYZER ANC (IANC): ABNORMAL
ANION GAP SERPL CALC-SCNC: 7 MMOL/L (ref 10–20)
ANION GAP SERPL CALC-SCNC: 8 MMOL/L (ref 10–20)
AST SERPL-CCNC: 30 UNITS/L
BASOPHILS # BLD: 0 K/MCL (ref 0–0.3)
BASOPHILS NFR BLD: 0 %
BILIRUB SERPL-MCNC: 0.4 MG/DL (ref 0.2–1)
BOCAVIRUS (RBOCA): NOT DETECTED
BUN SERPL-MCNC: 12 MG/DL (ref 6–20)
BUN SERPL-MCNC: 14 MG/DL (ref 6–20)
BUN/CREAT SERPL: 13 (ref 7–25)
BUN/CREAT SERPL: 15 (ref 7–25)
C. PNEUMONIAE (RCHLP): NOT DETECTED
CALCIUM SERPL-MCNC: 8.1 MG/DL (ref 8.4–10.2)
CALCIUM SERPL-MCNC: 8.2 MG/DL (ref 8.4–10.2)
CHLORIDE SERPL-SCNC: 111 MMOL/L (ref 98–107)
CHLORIDE SERPL-SCNC: 111 MMOL/L (ref 98–107)
CO2 SERPL-SCNC: 28 MMOL/L (ref 21–32)
CO2 SERPL-SCNC: 29 MMOL/L (ref 21–32)
CORONAVIRUS 229E (RC229E): NOT DETECTED
CORONAVIRUS HKU1 (RCHKU1): NOT DETECTED
CORONAVIRUS NL63 (RCNL63): NOT DETECTED
CORONAVIRUS OC43 (RCO43): NOT DETECTED
CREAT SERPL-MCNC: 0.92 MG/DL (ref 0.67–1.17)
CREAT SERPL-MCNC: 0.92 MG/DL (ref 0.67–1.17)
DIFFERENTIAL METHOD BLD: ABNORMAL
EOSINOPHIL # BLD: 0.1 K/MCL (ref 0.1–0.5)
EOSINOPHIL NFR BLD: 1 %
ERYTHROCYTE [DISTWIDTH] IN BLOOD: 13.2 % (ref 11–15)
GLOBULIN SER-MCNC: 3.2 G/DL (ref 2–4)
GLUCOSE BLDC GLUCOMTR-MCNC: 167 MG/DL (ref 70–99)
GLUCOSE BLDC GLUCOMTR-MCNC: 205 MG/DL (ref 70–99)
GLUCOSE BLDC GLUCOMTR-MCNC: 84 MG/DL (ref 70–99)
GLUCOSE BLDC GLUCOMTR-MCNC: ABNORMAL MG/DL
GLUCOSE BLDC GLUCOMTR-MCNC: NORMAL MG/DL
GLUCOSE SERPL-MCNC: 129 MG/DL (ref 65–99)
GLUCOSE SERPL-MCNC: 132 MG/DL (ref 65–99)
HCT VFR BLD CALC: 36.2 % (ref 39–51)
HGB BLD-MCNC: 11.4 G/DL (ref 13–17)
IMM GRANULOCYTES # BLD AUTO: 0 K/MCL (ref 0–0.2)
IMM GRANULOCYTES NFR BLD: 0 %
INFLUENZA A SUBTYPE H1 (RFLH1): NOT DETECTED
INFLUENZA A SUBTYPE H3 (RFLH3): NOT DETECTED
INFLUENZA A UNSUBTYPABLE (RIAU): NORMAL
INFLUENZA B VIRUS (RFLUB): NOT DETECTED
LYMPHOCYTES # BLD: 1.9 K/MCL (ref 1–4)
LYMPHOCYTES NFR BLD: 24 %
M. PNEUMONIAE (RMYPP): NORMAL
M. PNEUMONIAE (RMYPP): NOT DETECTED
MCH RBC QN AUTO: 30.6 PG (ref 26–34)
MCHC RBC AUTO-ENTMCNC: 31.5 G/DL (ref 32–36.5)
MCV RBC AUTO: 97.1 FL (ref 78–100)
METAPNEUMOVIRUS (RMETA): NOT DETECTED
MONOCYTES # BLD: 0.7 K/MCL (ref 0.3–0.9)
MONOCYTES NFR BLD: 9 %
NEUTROPHILS # BLD: 5 K/MCL (ref 1.8–7.7)
NEUTROPHILS NFR BLD: 66 %
NEUTS SEG NFR BLD: ABNORMAL %
NRBC (NRBCRE): 0 /100 WBC
PARAINFLUENZA, TYPE 1 (RPAR1): NOT DETECTED
PARAINFLUENZA, TYPE 2 (RPAR2): NOT DETECTED
PARAINFLUENZA, TYPE 3 (RPAR3): NOT DETECTED
PARAINFLUENZA, TYPE 4 (RPAR4): NOT DETECTED
PLATELET # BLD: 90 K/MCL (ref 140–450)
PLATELET # BLD: ABNORMAL 10*3/UL
POTASSIUM SERPL-SCNC: 3.6 MMOL/L (ref 3.4–5.1)
POTASSIUM SERPL-SCNC: 3.7 MMOL/L (ref 3.4–5.1)
PROCALCITONIN SERPL IA-MCNC: <0.05 NG/ML
PROCALCITONIN SERPL IA-MCNC: NORMAL NG/ML
PROT SERPL-MCNC: 6.6 G/DL (ref 6.4–8.2)
RBC # BLD: 3.73 MIL/MCL (ref 4.5–5.9)
RHINOVIRUS/ENTEROVIRUS (RRHINO): NOT DETECTED
RSV, SUBTYPE A (RRSVA): NOT DETECTED
RSV, SUBTYPE B (RRSVB): NOT DETECTED
SODIUM SERPL-SCNC: 142 MMOL/L (ref 135–145)
SODIUM SERPL-SCNC: 144 MMOL/L (ref 135–145)
SPECIMEN SOURCE: NORMAL
TROPONIN I SERPL HS-MCNC: 0.06 NG/ML
TROPONIN I SERPL HS-MCNC: ABNORMAL NG/L
TROPONIN I SERPL HS-MCNC: ABNORMAL NG/L
WBC # BLD: 7.7 K/MCL (ref 4.2–11)

## 2019-12-04 PROCEDURE — 99223 1ST HOSP IP/OBS HIGH 75: CPT | Performed by: FAMILY MEDICINE

## 2019-12-05 LAB
ANALYZER ANC (IANC): ABNORMAL
ANION GAP SERPL CALC-SCNC: 5 MMOL/L (ref 10–20)
BASOPHILS # BLD: 0 K/MCL (ref 0–0.3)
BASOPHILS NFR BLD: 1 %
BUN SERPL-MCNC: 10 MG/DL (ref 6–20)
BUN/CREAT SERPL: 11 (ref 7–25)
CALCIUM SERPL-MCNC: 8.2 MG/DL (ref 8.4–10.2)
CHLORIDE SERPL-SCNC: 113 MMOL/L (ref 98–107)
CO2 SERPL-SCNC: 31 MMOL/L (ref 21–32)
CREAT SERPL-MCNC: 0.9 MG/DL (ref 0.67–1.17)
DIFFERENTIAL METHOD BLD: ABNORMAL
EOSINOPHIL # BLD: 0.1 K/MCL (ref 0.1–0.5)
EOSINOPHIL NFR BLD: 2 %
ERYTHROCYTE [DISTWIDTH] IN BLOOD: 13.2 % (ref 11–15)
GLUCOSE BLDC GLUCOMTR-MCNC: 100 MG/DL (ref 70–99)
GLUCOSE BLDC GLUCOMTR-MCNC: 208 MG/DL (ref 70–99)
GLUCOSE BLDC GLUCOMTR-MCNC: 82 MG/DL (ref 70–99)
GLUCOSE BLDC GLUCOMTR-MCNC: ABNORMAL MG/DL
GLUCOSE BLDC GLUCOMTR-MCNC: ABNORMAL MG/DL
GLUCOSE SERPL-MCNC: 96 MG/DL (ref 65–99)
HCT VFR BLD CALC: 36.4 % (ref 39–51)
HGB BLD-MCNC: 11.5 G/DL (ref 13–17)
IMM GRANULOCYTES # BLD AUTO: 0 K/MCL (ref 0–0.2)
IMM GRANULOCYTES NFR BLD: 0 %
LYMPHOCYTES # BLD: 1.7 K/MCL (ref 1–4)
LYMPHOCYTES NFR BLD: 29 %
MCH RBC QN AUTO: 30.9 PG (ref 26–34)
MCHC RBC AUTO-ENTMCNC: 31.6 G/DL (ref 32–36.5)
MCV RBC AUTO: 97.8 FL (ref 78–100)
MONOCYTES # BLD: 0.7 K/MCL (ref 0.3–0.9)
MONOCYTES NFR BLD: 11 %
NEUTROPHILS # BLD: 3.5 K/MCL (ref 1.8–7.7)
NEUTROPHILS NFR BLD: 57 %
NEUTS SEG NFR BLD: ABNORMAL %
NRBC (NRBCRE): 0 /100 WBC
PLATELET # BLD: 88 K/MCL (ref 140–450)
POTASSIUM SERPL-SCNC: 4.2 MMOL/L (ref 3.4–5.1)
RBC # BLD: 3.72 MIL/MCL (ref 4.5–5.9)
SODIUM SERPL-SCNC: 145 MMOL/L (ref 135–145)
TSH SERPL-ACNC: 2.19 MCUNITS/ML (ref 0.35–5)
TSH SERPL-ACNC: NORMAL M[IU]/L
WBC # BLD: 6 K/MCL (ref 4.2–11)

## 2019-12-05 PROCEDURE — 99239 HOSP IP/OBS DSCHRG MGMT >30: CPT | Performed by: FAMILY MEDICINE

## 2019-12-09 LAB
BACTERIA BLD CULT: NORMAL

## 2025-01-11 ENCOUNTER — APPOINTMENT (OUTPATIENT)
Dept: CT IMAGING | Facility: HOSPITAL | Age: 85
End: 2025-01-11
Attending: EMERGENCY MEDICINE
Payer: MEDICARE

## 2025-01-11 ENCOUNTER — HOSPITAL ENCOUNTER (INPATIENT)
Facility: HOSPITAL | Age: 85
LOS: 4 days | Discharge: HOME | End: 2025-01-15
Attending: EMERGENCY MEDICINE | Admitting: INTERNAL MEDICINE
Payer: MEDICARE

## 2025-01-11 ENCOUNTER — APPOINTMENT (OUTPATIENT)
Dept: GENERAL RADIOLOGY | Facility: HOSPITAL | Age: 85
End: 2025-01-11
Attending: EMERGENCY MEDICINE
Payer: MEDICARE

## 2025-01-11 DIAGNOSIS — R19.7 VOMITING AND DIARRHEA: ICD-10-CM

## 2025-01-11 DIAGNOSIS — J96.01 ACUTE HYPOXEMIC RESPIRATORY FAILURE (HCC): ICD-10-CM

## 2025-01-11 DIAGNOSIS — R11.10 VOMITING AND DIARRHEA: ICD-10-CM

## 2025-01-11 DIAGNOSIS — R79.89 ELEVATED TROPONIN: ICD-10-CM

## 2025-01-11 DIAGNOSIS — R50.9 ACUTE FEBRILE ILLNESS: Primary | ICD-10-CM

## 2025-01-11 DIAGNOSIS — R65.10 SIRS (SYSTEMIC INFLAMMATORY RESPONSE SYNDROME) (HCC): ICD-10-CM

## 2025-01-11 LAB
ANION GAP SERPL CALC-SCNC: 11 MMOL/L (ref 0–18)
ANTIBODY SCREEN: NEGATIVE
APTT PPP: 32.6 SECONDS (ref 23–36)
BASOPHILS # BLD AUTO: 0.01 X10(3) UL (ref 0–0.2)
BASOPHILS NFR BLD AUTO: 0.2 %
BUN BLD-MCNC: 14 MG/DL (ref 9–23)
BUN/CREAT SERPL: 12.8 (ref 10–20)
CALCIUM BLD-MCNC: 8.9 MG/DL (ref 8.7–10.4)
CHLORIDE SERPL-SCNC: 109 MMOL/L (ref 98–112)
CHOLEST SERPL-MCNC: 78 MG/DL (ref ?–200)
CO2 SERPL-SCNC: 25 MMOL/L (ref 21–32)
CREAT BLD-MCNC: 1.09 MG/DL
D DIMER PPP FEU-MCNC: 1.64 UG/ML FEU (ref ?–0.82)
DEPRECATED RDW RBC AUTO: 48.8 FL (ref 35.1–46.3)
EGFRCR SERPLBLD CKD-EPI 2021: 68 ML/MIN/1.73M2 (ref 60–?)
EOSINOPHIL # BLD AUTO: 0.02 X10(3) UL (ref 0–0.7)
EOSINOPHIL NFR BLD AUTO: 0.4 %
ERYTHROCYTE [DISTWIDTH] IN BLOOD BY AUTOMATED COUNT: 15.2 % (ref 11–15)
FLUAV + FLUBV RNA SPEC NAA+PROBE: NEGATIVE
FLUAV + FLUBV RNA SPEC NAA+PROBE: NEGATIVE
GLUCOSE BLD-MCNC: 132 MG/DL (ref 70–99)
GLUCOSE BLDC GLUCOMTR-MCNC: 118 MG/DL (ref 70–99)
GLUCOSE BLDC GLUCOMTR-MCNC: 128 MG/DL (ref 70–99)
HCT VFR BLD AUTO: 31.4 %
HDLC SERPL-MCNC: 34 MG/DL (ref 40–59)
HGB BLD-MCNC: 9.4 G/DL
IMM GRANULOCYTES # BLD AUTO: 0.02 X10(3) UL (ref 0–1)
IMM GRANULOCYTES NFR BLD: 0.4 %
LACTATE SERPL-SCNC: 1.5 MMOL/L (ref 0.5–2)
LDLC SERPL CALC-MCNC: 27 MG/DL (ref ?–100)
LYMPHOCYTES # BLD AUTO: 0.51 X10(3) UL (ref 1–4)
LYMPHOCYTES NFR BLD AUTO: 9.1 %
MCH RBC QN AUTO: 26.3 PG (ref 26–34)
MCHC RBC AUTO-ENTMCNC: 29.9 G/DL (ref 31–37)
MCV RBC AUTO: 87.7 FL
MONOCYTES # BLD AUTO: 0.45 X10(3) UL (ref 0.1–1)
MONOCYTES NFR BLD AUTO: 8 %
NEUTROPHILS # BLD AUTO: 4.59 X10 (3) UL (ref 1.5–7.7)
NEUTROPHILS # BLD AUTO: 4.59 X10(3) UL (ref 1.5–7.7)
NEUTROPHILS NFR BLD AUTO: 81.9 %
NONHDLC SERPL-MCNC: 44 MG/DL (ref ?–130)
NT-PROBNP SERPL-MCNC: 9711 PG/ML (ref ?–450)
OSMOLALITY SERPL CALC.SUM OF ELEC: 302 MOSM/KG (ref 275–295)
PLATELET # BLD AUTO: 129 10(3)UL (ref 150–450)
PLATELETS.RETICULATED NFR BLD AUTO: 3.8 % (ref 0–7)
POTASSIUM SERPL-SCNC: 3.9 MMOL/L (ref 3.5–5.1)
PROCALCITONIN SERPL-MCNC: 4.98 NG/ML (ref ?–0.05)
RBC # BLD AUTO: 3.58 X10(6)UL
RH BLOOD TYPE: POSITIVE
RH BLOOD TYPE: POSITIVE
RSV RNA SPEC NAA+PROBE: NEGATIVE
SARS-COV-2 RNA RESP QL NAA+PROBE: NOT DETECTED
SODIUM SERPL-SCNC: 145 MMOL/L (ref 136–145)
TRIGL SERPL-MCNC: 85 MG/DL (ref 30–149)
TROPONIN I SERPL HS-MCNC: 132 NG/L
TROPONIN I SERPL HS-MCNC: 181 NG/L
VLDLC SERPL CALC-MCNC: 11 MG/DL (ref 0–30)
WBC # BLD AUTO: 5.6 X10(3) UL (ref 4–11)

## 2025-01-11 PROCEDURE — 84484 ASSAY OF TROPONIN QUANT: CPT | Performed by: EMERGENCY MEDICINE

## 2025-01-11 PROCEDURE — 94640 AIRWAY INHALATION TREATMENT: CPT

## 2025-01-11 PROCEDURE — 82962 GLUCOSE BLOOD TEST: CPT

## 2025-01-11 PROCEDURE — 99285 EMERGENCY DEPT VISIT HI MDM: CPT

## 2025-01-11 PROCEDURE — 86850 RBC ANTIBODY SCREEN: CPT | Performed by: EMERGENCY MEDICINE

## 2025-01-11 PROCEDURE — 94799 UNLISTED PULMONARY SVC/PX: CPT

## 2025-01-11 PROCEDURE — 85025 COMPLETE CBC W/AUTO DIFF WBC: CPT | Performed by: EMERGENCY MEDICINE

## 2025-01-11 PROCEDURE — 85379 FIBRIN DEGRADATION QUANT: CPT | Performed by: EMERGENCY MEDICINE

## 2025-01-11 PROCEDURE — 83880 ASSAY OF NATRIURETIC PEPTIDE: CPT | Performed by: EMERGENCY MEDICINE

## 2025-01-11 PROCEDURE — 83605 ASSAY OF LACTIC ACID: CPT | Performed by: EMERGENCY MEDICINE

## 2025-01-11 PROCEDURE — 93010 ELECTROCARDIOGRAM REPORT: CPT

## 2025-01-11 PROCEDURE — 80048 BASIC METABOLIC PNL TOTAL CA: CPT | Performed by: EMERGENCY MEDICINE

## 2025-01-11 PROCEDURE — 83036 HEMOGLOBIN GLYCOSYLATED A1C: CPT | Performed by: INTERNAL MEDICINE

## 2025-01-11 PROCEDURE — 86900 BLOOD TYPING SEROLOGIC ABO: CPT | Performed by: EMERGENCY MEDICINE

## 2025-01-11 PROCEDURE — 0241U SARS-COV-2/FLU A AND B/RSV BY PCR (GENEXPERT): CPT | Performed by: EMERGENCY MEDICINE

## 2025-01-11 PROCEDURE — 36415 COLL VENOUS BLD VENIPUNCTURE: CPT

## 2025-01-11 PROCEDURE — 93005 ELECTROCARDIOGRAM TRACING: CPT

## 2025-01-11 PROCEDURE — 84145 PROCALCITONIN (PCT): CPT | Performed by: EMERGENCY MEDICINE

## 2025-01-11 PROCEDURE — 96375 TX/PRO/DX INJ NEW DRUG ADDON: CPT

## 2025-01-11 PROCEDURE — 96365 THER/PROPH/DIAG IV INF INIT: CPT

## 2025-01-11 PROCEDURE — 71045 X-RAY EXAM CHEST 1 VIEW: CPT | Performed by: EMERGENCY MEDICINE

## 2025-01-11 PROCEDURE — 86901 BLOOD TYPING SEROLOGIC RH(D): CPT | Performed by: EMERGENCY MEDICINE

## 2025-01-11 PROCEDURE — 85730 THROMBOPLASTIN TIME PARTIAL: CPT | Performed by: EMERGENCY MEDICINE

## 2025-01-11 PROCEDURE — 80061 LIPID PANEL: CPT | Performed by: EMERGENCY MEDICINE

## 2025-01-11 PROCEDURE — 71260 CT THORAX DX C+: CPT | Performed by: EMERGENCY MEDICINE

## 2025-01-11 PROCEDURE — 87040 BLOOD CULTURE FOR BACTERIA: CPT | Performed by: EMERGENCY MEDICINE

## 2025-01-11 RX ORDER — ONDANSETRON 2 MG/ML
4 INJECTION INTRAMUSCULAR; INTRAVENOUS ONCE
Status: COMPLETED | OUTPATIENT
Start: 2025-01-11 | End: 2025-01-11

## 2025-01-11 RX ORDER — IPRATROPIUM BROMIDE AND ALBUTEROL SULFATE 2.5; .5 MG/3ML; MG/3ML
3 SOLUTION RESPIRATORY (INHALATION) ONCE
Status: COMPLETED | OUTPATIENT
Start: 2025-01-11 | End: 2025-01-11

## 2025-01-11 RX ORDER — ACETAMINOPHEN 500 MG
1000 TABLET ORAL ONCE
Status: COMPLETED | OUTPATIENT
Start: 2025-01-11 | End: 2025-01-11

## 2025-01-11 RX ORDER — CLOPIDOGREL BISULFATE 75 MG/1
75 TABLET ORAL DAILY
COMMUNITY

## 2025-01-11 RX ORDER — ASPIRIN 81 MG/1
324 TABLET, CHEWABLE ORAL ONCE
Status: COMPLETED | OUTPATIENT
Start: 2025-01-11 | End: 2025-01-11

## 2025-01-11 RX ORDER — OSELTAMIVIR PHOSPHATE 75 MG/1
75 CAPSULE ORAL 2 TIMES DAILY
Status: DISCONTINUED | OUTPATIENT
Start: 2025-01-11 | End: 2025-01-11

## 2025-01-12 ENCOUNTER — APPOINTMENT (OUTPATIENT)
Dept: ULTRASOUND IMAGING | Facility: HOSPITAL | Age: 85
End: 2025-01-12
Attending: INTERNAL MEDICINE
Payer: MEDICARE

## 2025-01-12 ENCOUNTER — APPOINTMENT (OUTPATIENT)
Dept: CV DIAGNOSTICS | Facility: HOSPITAL | Age: 85
End: 2025-01-12
Attending: EMERGENCY MEDICINE
Payer: MEDICARE

## 2025-01-12 LAB
ANION GAP SERPL CALC-SCNC: 8 MMOL/L (ref 0–18)
ATRIAL RATE: 81 BPM
BASOPHILS # BLD AUTO: 0.01 X10(3) UL (ref 0–0.2)
BASOPHILS NFR BLD AUTO: 0.3 %
BILIRUB UR QL: NEGATIVE
BUN BLD-MCNC: 16 MG/DL (ref 9–23)
BUN/CREAT SERPL: 15.5 (ref 10–20)
CALCIUM BLD-MCNC: 8.5 MG/DL (ref 8.7–10.4)
CHLORIDE SERPL-SCNC: 112 MMOL/L (ref 98–112)
CLARITY UR: CLEAR
CO2 SERPL-SCNC: 25 MMOL/L (ref 21–32)
COLOR UR: YELLOW
CREAT BLD-MCNC: 1.03 MG/DL
DEPRECATED RDW RBC AUTO: 49.6 FL (ref 35.1–46.3)
EGFRCR SERPLBLD CKD-EPI 2021: 73 ML/MIN/1.73M2 (ref 60–?)
EOSINOPHIL # BLD AUTO: 0.07 X10(3) UL (ref 0–0.7)
EOSINOPHIL NFR BLD AUTO: 2.1 %
ERYTHROCYTE [DISTWIDTH] IN BLOOD BY AUTOMATED COUNT: 15.2 % (ref 11–15)
EST. AVERAGE GLUCOSE BLD GHB EST-MCNC: 154 MG/DL (ref 68–126)
GLUCOSE BLD-MCNC: 119 MG/DL (ref 70–99)
GLUCOSE BLDC GLUCOMTR-MCNC: 116 MG/DL (ref 70–99)
GLUCOSE BLDC GLUCOMTR-MCNC: 120 MG/DL (ref 70–99)
GLUCOSE BLDC GLUCOMTR-MCNC: 126 MG/DL (ref 70–99)
GLUCOSE BLDC GLUCOMTR-MCNC: 138 MG/DL (ref 70–99)
GLUCOSE UR-MCNC: >1000 MG/DL
HBA1C MFR BLD: 7 % (ref ?–5.7)
HCT VFR BLD AUTO: 29.7 %
HGB BLD-MCNC: 8.7 G/DL
HGB UR QL STRIP.AUTO: NEGATIVE
IMM GRANULOCYTES # BLD AUTO: 0.02 X10(3) UL (ref 0–1)
IMM GRANULOCYTES NFR BLD: 0.6 %
KETONES UR-MCNC: NEGATIVE MG/DL
LEUKOCYTE ESTERASE UR QL STRIP.AUTO: NEGATIVE
LYMPHOCYTES # BLD AUTO: 0.55 X10(3) UL (ref 1–4)
LYMPHOCYTES NFR BLD AUTO: 16.1 %
MCH RBC QN AUTO: 26.4 PG (ref 26–34)
MCHC RBC AUTO-ENTMCNC: 29.3 G/DL (ref 31–37)
MCV RBC AUTO: 90 FL
MONOCYTES # BLD AUTO: 0.49 X10(3) UL (ref 0.1–1)
MONOCYTES NFR BLD AUTO: 14.4 %
NEUTROPHILS # BLD AUTO: 2.27 X10 (3) UL (ref 1.5–7.7)
NEUTROPHILS # BLD AUTO: 2.27 X10(3) UL (ref 1.5–7.7)
NEUTROPHILS NFR BLD AUTO: 66.5 %
NITRITE UR QL STRIP.AUTO: NEGATIVE
OSMOLALITY SERPL CALC.SUM OF ELEC: 302 MOSM/KG (ref 275–295)
P AXIS: 78 DEGREES
P-R INTERVAL: 288 MS
PH UR: 6 [PH] (ref 5–8)
PLATELET # BLD AUTO: 110 10(3)UL (ref 150–450)
PLATELETS.RETICULATED NFR BLD AUTO: 3.8 % (ref 0–7)
POTASSIUM SERPL-SCNC: 4 MMOL/L (ref 3.5–5.1)
PROT UR-MCNC: 30 MG/DL
Q-T INTERVAL: 380 MS
Q-T INTERVAL: 398 MS
QRS DURATION: 84 MS
QRS DURATION: 86 MS
QTC CALCULATION (BEZET): 441 MS
QTC CALCULATION (BEZET): 470 MS
R AXIS: -13 DEGREES
R AXIS: -14 DEGREES
RBC # BLD AUTO: 3.3 X10(6)UL
SODIUM SERPL-SCNC: 145 MMOL/L (ref 136–145)
SP GR UR STRIP: >1.03 (ref 1–1.03)
T AXIS: -51 DEGREES
T AXIS: 15 DEGREES
TROPONIN I SERPL HS-MCNC: 267 NG/L
UROBILINOGEN UR STRIP-ACNC: NORMAL
VENTRICULAR RATE: 81 BPM
VENTRICULAR RATE: 84 BPM
WBC # BLD AUTO: 3.4 X10(3) UL (ref 4–11)

## 2025-01-12 PROCEDURE — 80048 BASIC METABOLIC PNL TOTAL CA: CPT | Performed by: INTERNAL MEDICINE

## 2025-01-12 PROCEDURE — 93306 TTE W/DOPPLER COMPLETE: CPT | Performed by: EMERGENCY MEDICINE

## 2025-01-12 PROCEDURE — 81001 URINALYSIS AUTO W/SCOPE: CPT | Performed by: EMERGENCY MEDICINE

## 2025-01-12 PROCEDURE — 82962 GLUCOSE BLOOD TEST: CPT

## 2025-01-12 PROCEDURE — 84484 ASSAY OF TROPONIN QUANT: CPT | Performed by: INTERNAL MEDICINE

## 2025-01-12 PROCEDURE — 93970 EXTREMITY STUDY: CPT | Performed by: INTERNAL MEDICINE

## 2025-01-12 PROCEDURE — 85025 COMPLETE CBC W/AUTO DIFF WBC: CPT | Performed by: INTERNAL MEDICINE

## 2025-01-12 RX ORDER — PANTOPRAZOLE SODIUM 40 MG/1
40 TABLET, DELAYED RELEASE ORAL
COMMUNITY

## 2025-01-12 RX ORDER — FLUTICASONE PROPIONATE AND SALMETEROL 250; 50 UG/1; UG/1
1 POWDER RESPIRATORY (INHALATION) 2 TIMES DAILY
COMMUNITY

## 2025-01-12 RX ORDER — HEPARIN SODIUM 5000 [USP'U]/ML
5000 INJECTION, SOLUTION INTRAVENOUS; SUBCUTANEOUS EVERY 12 HOURS SCHEDULED
Status: DISCONTINUED | OUTPATIENT
Start: 2025-01-12 | End: 2025-01-12

## 2025-01-12 RX ORDER — ECHINACEA PURPUREA EXTRACT 125 MG
1 TABLET ORAL AS NEEDED
COMMUNITY

## 2025-01-12 RX ORDER — IPRATROPIUM BROMIDE AND ALBUTEROL SULFATE 2.5; .5 MG/3ML; MG/3ML
3 SOLUTION RESPIRATORY (INHALATION) EVERY 4 HOURS PRN
Status: DISCONTINUED | OUTPATIENT
Start: 2025-01-12 | End: 2025-01-15

## 2025-01-12 RX ORDER — LEVOTHYROXINE SODIUM 125 UG/1
125 TABLET ORAL
COMMUNITY

## 2025-01-12 RX ORDER — CLOPIDOGREL BISULFATE 75 MG/1
75 TABLET ORAL DAILY
Status: DISCONTINUED | OUTPATIENT
Start: 2025-01-12 | End: 2025-01-15

## 2025-01-12 RX ORDER — CARBOXYMETHYLCELLULOSE SODIUM 10 MG/ML
1 GEL OPHTHALMIC 4 TIMES DAILY
COMMUNITY

## 2025-01-12 RX ORDER — GLUCAGON 1 MG
1 KIT INJECTION ONCE
COMMUNITY

## 2025-01-12 RX ORDER — POTASSIUM CHLORIDE 750 MG/1
10 TABLET, EXTENDED RELEASE ORAL DAILY
COMMUNITY

## 2025-01-12 RX ORDER — SERTRALINE HYDROCHLORIDE 200 MG/1
200 CAPSULE ORAL DAILY
COMMUNITY

## 2025-01-12 RX ORDER — ACETAMINOPHEN 325 MG/1
650 TABLET ORAL EVERY 6 HOURS PRN
Status: DISCONTINUED | OUTPATIENT
Start: 2025-01-12 | End: 2025-01-15

## 2025-01-12 RX ORDER — NICOTINE POLACRILEX 4 MG
30 LOZENGE BUCCAL
Status: DISCONTINUED | OUTPATIENT
Start: 2025-01-12 | End: 2025-01-15

## 2025-01-12 RX ORDER — LATANOPROST 50 UG/ML
1 SOLUTION/ DROPS OPHTHALMIC NIGHTLY
COMMUNITY

## 2025-01-12 RX ORDER — PRIMIDONE 50 MG/1
50 TABLET ORAL 2 TIMES DAILY
COMMUNITY

## 2025-01-12 RX ORDER — ATORVASTATIN CALCIUM 40 MG/1
40 TABLET, FILM COATED ORAL NIGHTLY
Status: DISCONTINUED | OUTPATIENT
Start: 2025-01-12 | End: 2025-01-15

## 2025-01-12 RX ORDER — CARVEDILOL 3.12 MG/1
3.12 TABLET ORAL 2 TIMES DAILY WITH MEALS
COMMUNITY

## 2025-01-12 RX ORDER — ALBUTEROL SULFATE 90 UG/1
2 INHALANT RESPIRATORY (INHALATION) 4 TIMES DAILY PRN
COMMUNITY

## 2025-01-12 RX ORDER — DOCUSATE SODIUM 100 MG/1
100 CAPSULE, LIQUID FILLED ORAL 2 TIMES DAILY
COMMUNITY

## 2025-01-12 RX ORDER — NICOTINE POLACRILEX 4 MG
15 LOZENGE BUCCAL
Status: DISCONTINUED | OUTPATIENT
Start: 2025-01-12 | End: 2025-01-15

## 2025-01-12 RX ORDER — TAMSULOSIN HYDROCHLORIDE 0.4 MG/1
0.4 CAPSULE ORAL NIGHTLY
COMMUNITY

## 2025-01-12 RX ORDER — BENZONATATE 100 MG/1
100 CAPSULE ORAL 3 TIMES DAILY PRN
COMMUNITY

## 2025-01-12 RX ORDER — DEXTROSE MONOHYDRATE 25 G/50ML
50 INJECTION, SOLUTION INTRAVENOUS
Status: DISCONTINUED | OUTPATIENT
Start: 2025-01-12 | End: 2025-01-15

## 2025-01-12 RX ORDER — NYSTATIN 10B UNIT
POWDER (EA) MISCELLANEOUS
COMMUNITY

## 2025-01-12 RX ORDER — ATORVASTATIN CALCIUM 80 MG/1
80 TABLET, FILM COATED ORAL DAILY
COMMUNITY

## 2025-01-12 RX ORDER — ALENDRONATE SODIUM 70 MG/1
70 TABLET ORAL
COMMUNITY

## 2025-01-12 RX ORDER — METOPROLOL SUCCINATE 50 MG/1
50 TABLET, EXTENDED RELEASE ORAL
Status: DISCONTINUED | OUTPATIENT
Start: 2025-01-12 | End: 2025-01-12

## 2025-01-12 RX ORDER — FLUTICASONE PROPIONATE 50 MCG
2 SPRAY, SUSPENSION (ML) NASAL DAILY
COMMUNITY

## 2025-01-12 RX ORDER — CETIRIZINE HYDROCHLORIDE 10 MG/1
10 TABLET ORAL DAILY
COMMUNITY

## 2025-01-12 RX ORDER — NYSTATIN 100000 [USP'U]/G
1 POWDER TOPICAL 2 TIMES DAILY
COMMUNITY

## 2025-01-12 RX ORDER — MIRABEGRON 25 MG/1
25 TABLET, FILM COATED, EXTENDED RELEASE ORAL DAILY
COMMUNITY

## 2025-01-12 RX ORDER — LISINOPRIL 10 MG/1
10 TABLET ORAL DAILY
Status: DISCONTINUED | OUTPATIENT
Start: 2025-01-12 | End: 2025-01-15

## 2025-01-12 RX ORDER — CARVEDILOL 3.12 MG/1
3.12 TABLET ORAL 2 TIMES DAILY WITH MEALS
Status: DISCONTINUED | OUTPATIENT
Start: 2025-01-12 | End: 2025-01-15

## 2025-01-12 NOTE — H&P
[facility]  part of [facility]    History & Physical    Joshua Liz Sr. Patient Status:  Inpatient    1940 MRN [id number]   Location [facility] 3W/SW Attending Junaid Gill MD   Hosp Day # 1 PCP No primary care provider on file.     Date:  2025  Date of Admission:  2025    History provided by:patient  Chief Complaint:     Chief Complaint   Patient presents with    Nausea/Vomiting/Diarrhea    Cough/URI       HPI:   Joshua Liz Sr. is a(n) 84 year old male.      History     Past Medical History:    COPD (chronic obstructive pulmonary disease) (HCC)    Diabetes (HCC)    High blood pressure    High cholesterol    Peripheral neuropathy    Sleep apnea     Past Surgical History:   Procedure Laterality Date    Cabg       No family history on file.  Social History:  Social History     Socioeconomic History    Marital status:    Tobacco Use    Smoking status: Former     Types: Cigarettes     Passive exposure: Past    Smokeless tobacco: Never   Vaping Use    Vaping status: Never Used   Substance and Sexual Activity    Alcohol use: Not Currently    Drug use: Never     Social Drivers of Health     Food Insecurity: No Food Insecurity (2025)    Food Insecurity     Food Insecurity: Never true   Transportation Needs: No Transportation Needs (2025)    Transportation Needs     Lack of Transportation: No   Housing Stability: Low Risk  (2025)    Housing Stability     Housing Instability: No     Allergies/Medications:   Allergies: Allergies[1]  Medications Prior to Admission   Medication Sig    clopidogrel 75 MG Oral Tab Take 1 tablet (75 mg total) by mouth daily.       Review of Systems:   {Review Of Systems:20115}    Physical Exam:   Vital Signs:  Blood pressure 99/60, pulse 82, temperature 100.2 °F (37.9 °C), temperature source Oral, resp. rate (!) 28, weight 171 lb 14.4 oz (78 kg), SpO2 93%.     {Exam; System select, Male & Female:5706}        Results:      Lab Results   Component Value Date    WBC 5.6 01/11/2025    HGB 9.4 (L) 01/11/2025    HCT 31.4 (L) 01/11/2025    .0 (L) 01/11/2025    CREATSERUM 1.09 01/11/2025    BUN 14 01/11/2025     01/11/2025    K 3.9 01/11/2025     01/11/2025    CO2 25.0 01/11/2025     (H) 01/11/2025    CA 8.9 01/11/2025    PTT 32.6 01/11/2025    DDIMER 1.64 (H) 01/11/2025       CT CHEST PE AORTA (IV ONLY) (CPT=71260)    Result Date: 1/11/2025  CONCLUSION:  1. No pulmonary embolus. 2. CHF with pulmonary interstitial edema and bibasilar pleural effusions. 3. Coronary artery disease post coronary bypass. 4. Aortic valve calcification.  Mildly dilated aorta-ascending aorta 4.5 cm, aortic root 4.3 cm. 5. Mild nonspecific paratracheal and precarinal lymphadenopathy. 6. Incompletely evaluated moderate splenomegaly .    Dictated by (CST): Nate Beck MD on 1/11/2025 at 10:51 PM     Finalized by (CST): Nate Beck MD on 1/11/2025 at 11:02 PM          XR CHEST AP PORTABLE  (CPT=71045)    Result Date: 1/11/2025  CONCLUSION:  1. No pneumonia. 2. Cardiomegaly with mild pulmonary venous congestion.    Dictated by (CST): Nate Beck MD on 1/11/2025 at 8:58 PM     Finalized by (CST): Nate Beck MD on 1/11/2025 at 8:59 PM         EKG 12 Lead    Result Date: 1/11/2025  Sinus rhythm with marked sinus arrythmia with 1st degree A-V block Nonspecific ST and T wave abnormality Abnormal ECG When compared with ECG of 11-JAN-2025 19:23, Sinus rhythm has replaced Atrial fibrillation    EKG 12 Lead    Result Date: 1/11/2025  Atrial fibrillation Nonspecific ST and T wave abnormality Abnormal ECG No previous ECGs found in [facility]     Assessment/Plan:     Acute febrile illness  ***      Acute hypoxemic respiratory failure (HCC)  ***      Elevated troponin  ***      Active Orders   LAB    Urinalysis, Routine     Frequency: STAT     Number of Occurrences: 1 Occurrences   Nourishments    Room Service Eligibility Until Discontinued      Frequency: Until Discontinued     Number of Occurrences: Until Specified    Room Service Notify RN Until Discontinued     Frequency: Until Discontinued     Number of Occurrences: Until Specified   Microbiology    Clostridium difficile(toxigenic)PCR     Frequency: Once     Number of Occurrences: 1 Occurrences    GI Stool panel by PCR     Frequency: Once     Number of Occurrences: 1 Occurrences   Nursing    Accucheck     Frequency: Once     Number of Occurrences: 1 Occurrences    Accucheck     Frequency: TID AC and HS     Number of Occurrences: Until Specified    Cardiac monitoring     Frequency: Continuous     Number of Occurrences: 2 Hours     Order Comments: ED holding order only  Cancel if other admission orders exist!        Cardiac monitoring     Frequency: Until Discontinued     Number of Occurrences: Until Specified    Hypoglycemia Protocol for Adults     Frequency: PRN     Number of Occurrences: Until Specified     Order Comments: blood glucose < 70      Initiate Oxygen Protocol     Frequency: Until Discontinued     Number of Occurrences: 2 Hours    Notify physician (specify)     Frequency: Until Discontinued     Number of Occurrences: Until Specified    Pulse oximetry     Frequency: Continuous     Number of Occurrences: 2 Hours     Order Comments: ED holding order only  Cancel if other admission orders exist!        Vital signs     Frequency: Per Unit Routine     Number of Occurrences: 2 Hours     Order Comments: ED holding order only  Cancel if other admission orders exist!       Consult    ED Consult to Cardiology     Frequency: Once     Number of Occurrences: 1 Occurrences   Isolation    Enteric/Contact PLUS Isolation Continuous     Frequency: Continuous     Number of Occurrences: Until Specified   Medications    sodium chloride 0.9 % IV bolus 2,370 mL     Frequency: Continuous     Dose: 30 mL/kg     Route: Intravenous   Cardiology    CARD ECHO 2D DOPPLER (CPT=93306)     Frequency: Once     Number of  Occurrences: 1 Occurrences       Junaid Gill MD  1/12/2025        [1]   Allergies  Allergen Reactions    Codeine HIVES

## 2025-01-12 NOTE — PLAN OF CARE
Called and spoke with supervisor Daryl requested Echo/Cath records from 12/24 and med rec. He stated unable to get records of reports on Sunday as he has limited access and I would have to call tomorrow. He will provide med rec after I fax release. I passed phone to Dr Peralta to speak with Daryl and he was told the same information. Will have morning nurse to follow up.   (1) 41-60 years

## 2025-01-12 NOTE — PLAN OF CARE
Problem: Patient Centered Care  Goal: Patient preferences are identified and integrated in the patient's plan of care  Description: Interventions:  - What would you like us to know as we care for you? From [facility]   - Provide timely, complete, and accurate information to patient/family  - Incorporate patient and family knowledge, values, beliefs, and cultural backgrounds into the planning and delivery of care  - Encourage patient/family to participate in care and decision-making at the level they choose  - Honor patient and family perspectives and choices  Outcome: Progressing     Problem: Patient/Family Goals  Goal: Patient/Family Long Term Goal  Description: Patient's Long Term Goal: discharge home     Interventions:  - See additional Care Plan goals for specific interventions  Outcome: Progressing  Goal: Patient/Family Short Term Goal  Description: Patient's Short Term Goal:     Interventions:   - See additional Care Plan goals for specific interventions  Outcome: Progressing     Problem: RESPIRATORY - ADULT  Goal: Achieves optimal ventilation and oxygenation  Description: INTERVENTIONS:  - Assess for changes in respiratory status  - Assess for changes in mentation and behavior  - Position to facilitate oxygenation and minimize respiratory effort  - Oxygen supplementation based on oxygen saturation or ABGs  - Provide Smoking Cessation handout, if applicable  - Encourage broncho-pulmonary hygiene including cough, deep breathe, Incentive Spirometry  - Assess the need for suctioning and perform as needed  - Assess and instruct to report SOB or any respiratory difficulty  - Respiratory Therapy support as indicated  - Manage/alleviate anxiety  - Monitor for signs/symptoms of CO2 retention  Outcome: Progressing     Problem: GASTROINTESTINAL - ADULT  Goal: Minimal or absence of nausea and vomiting  Description: INTERVENTIONS:  - Maintain adequate hydration with IV or PO as ordered and tolerated  -  Nasogastric tube to low intermittent suction as ordered  - Evaluate effectiveness of ordered antiemetic medications  - Provide nonpharmacologic comfort measures as appropriate  - Advance diet as tolerated, if ordered  - Obtain nutritional consult as needed  - Evaluate fluid balance  Outcome: Progressing  Goal: Maintains or returns to baseline bowel function  Description: INTERVENTIONS:  - Assess bowel function  - Maintain adequate hydration with IV or PO as ordered and tolerated  - Evaluate effectiveness of GI medications  - Encourage mobilization and activity  - Obtain nutritional consult as needed  - Establish a toileting routine/schedule  - Consider collaborating with pharmacy to review patient's medication profile  Outcome: Progressing     Problem: METABOLIC/FLUID AND ELECTROLYTES - ADULT  Goal: Glucose maintained within prescribed range  Description: INTERVENTIONS:  - Monitor Blood Glucose as ordered  - Assess for signs and symptoms of hyperglycemia and hypoglycemia  - Administer ordered medications to maintain glucose within target range  - Assess barriers to adequate nutritional intake and initiate nutrition consult as needed  - Instruct patient on self management of diabetes  Outcome: Progressing  Goal: Electrolytes maintained within normal limits  Description: INTERVENTIONS:  - Monitor labs and rhythm and assess patient for signs and symptoms of electrolyte imbalances  - Administer electrolyte replacement as ordered  - Monitor response to electrolyte replacements, including rhythm and repeat lab results as appropriate  - Fluid restriction as ordered  - Instruct patient on fluid and nutrition restrictions as appropriate  Outcome: Progressing

## 2025-01-12 NOTE — H&P
[facility]  part of [facility]    History & Physical    Joshua Liz Sr. Patient Status:  Inpatient    1940 MRN [id number]   Location [facility] 3W/SW Attending Junaid Gill MD   Hosp Day # 1 PCP No primary care provider on file.     Date:  2025  Date of Admission:  2025    History provided by:patient  Chief Complaint:     Chief Complaint   Patient presents with    Nausea/Vomiting/Diarrhea    Cough/URI       HPI:   Joshua Liz Sr. is a(n) 84 year old male. COPD, diabetes mellitus type 2, hypertension, hyperlipidemia, peripheral neuropathy,  Coronary artery disease on medical therapy, atrial fibrillation on anticoagulation who was in his usual state of health about 2 weeks ago the patient started having vomiting and the diarrhea.  Patient was also recently diagnosed with  COVID-19 about 2 weeks ago.  He is also exposed to RSV virus his wife is having infection with RSV,   The patient denies any cough.  Patient denies any urinary symptoms.  No burning.   There was no coffee-ground emesis.    There is no doctor is stools.    Patient denies any abdominal pain.    Patient came to the emergency room.    The patient was also having fever.    Patient is feeling much better now.    Patient denies any abdominal pain no nausea no vomiting at the time of the examination.      History     Past Medical History:    COPD (chronic obstructive pulmonary disease) (HCC)    Diabetes (HCC)    High blood pressure    High cholesterol    Peripheral neuropathy    Sleep apnea     Past Surgical History:   Procedure Laterality Date    Cabg       No family history on file.  Social History:  Social History     Socioeconomic History    Marital status:    Tobacco Use    Smoking status: Former     Types: Cigarettes     Passive exposure: Past    Smokeless tobacco: Never   Vaping Use    Vaping status: Never Used   Substance and Sexual Activity    Alcohol use: Not Currently    Drug use:  Never     Social Drivers of Health     Food Insecurity: No Food Insecurity (1/11/2025)    Food Insecurity     Food Insecurity: Never true   Transportation Needs: No Transportation Needs (1/11/2025)    Transportation Needs     Lack of Transportation: No   Housing Stability: Low Risk  (1/11/2025)    Housing Stability     Housing Instability: No     Allergies/Medications:   Allergies: Allergies[1]  Medications Prior to Admission   Medication Sig    clopidogrel 75 MG Oral Tab Take 1 tablet (75 mg total) by mouth daily.       Review of Systems:   Pertinent items are noted in HPI.    No headache.    Patient having some cough.    Patient denies any shortness of breath now.      No chest pain.    No swelling of the legs.        Physical Exam:   Vital Signs:  Blood pressure 99/60, pulse 82, temperature 100.2 °F (37.9 °C), temperature source Oral, resp. rate (!) 28, weight 171 lb 14.4 oz (78 kg), SpO2 93%.     Patient lying comfortably in bed.    HEENT:  Pallor is noted no icterus.      Neck no JVD no carotid bruit.    Heart S1 and S2 regular in rate and rhythm.    Lungs bilateral crackles.    Abdomen is soft bowel sounds are present  Extremities no pedal edema  CNS exam to patient conscious and alert no focal deficit.            Results:     Lab Results   Component Value Date    WBC 5.6 01/11/2025    HGB 9.4 (L) 01/11/2025    HCT 31.4 (L) 01/11/2025    .0 (L) 01/11/2025    CREATSERUM 1.09 01/11/2025    BUN 14 01/11/2025     01/11/2025    K 3.9 01/11/2025     01/11/2025    CO2 25.0 01/11/2025     (H) 01/11/2025    CA 8.9 01/11/2025    PTT 32.6 01/11/2025    DDIMER 1.64 (H) 01/11/2025       CT CHEST PE AORTA (IV ONLY) (CPT=71260)    Result Date: 1/11/2025  CONCLUSION:  1. No pulmonary embolus. 2. CHF with pulmonary interstitial edema and bibasilar pleural effusions. 3. Coronary artery disease post coronary bypass. 4. Aortic valve calcification.  Mildly dilated aorta-ascending aorta 4.5 cm, aortic root  4.3 cm. 5. Mild nonspecific paratracheal and precarinal lymphadenopathy. 6. Incompletely evaluated moderate splenomegaly .    Dictated by (CST): Nate Beck MD on 1/11/2025 at 10:51 PM     Finalized by (CST): Nate Beck MD on 1/11/2025 at 11:02 PM          XR CHEST AP PORTABLE  (CPT=71045)    Result Date: 1/11/2025  CONCLUSION:  1. No pneumonia. 2. Cardiomegaly with mild pulmonary venous congestion.    Dictated by (CST): Nate Beck MD on 1/11/2025 at 8:58 PM     Finalized by (CST): Nate Beck MD on 1/11/2025 at 8:59 PM         EKG 12 Lead    Result Date: 1/11/2025  Sinus rhythm with marked sinus arrythmia with 1st degree A-V block Nonspecific ST and T wave abnormality Abnormal ECG When compared with ECG of 11-JAN-2025 19:23, Sinus rhythm has replaced Atrial fibrillation    EKG 12 Lead    Result Date: 1/11/2025  Atrial fibrillation Nonspecific ST and T wave abnormality Abnormal ECG No previous ECGs found in [facility]     Assessment/Plan:     Acute febrile illness  Could be viral syndrome.    Chest x-ray does not reveal any pneumonia.    UA is also negative.     Will follow him clinically.    Patient was empirically started on ceftriaxone by the emergency room physician.  Will continue for now.      The      Acute hypoxemic respiratory failure (HCC)   Continue supportive care could be from pulmonary edema.          Elevated troponin  Could be from demand ischemia.    Also type 2 myocardial infarction.    Consult Cardiology.      Paroxysmal atrial fibrillation.  Continue current treatment.    Follow Cardiology recommendations.      Acute on chronic congestive heart failure with preserved ejection fraction  Patient was given Lasix.  Will follow him clinically.      Coronary artery disease stable.      Nausea vomiting and diarrhea.    Most likely acute gastroenteritis could is Lucy a viral gastroenteritis could also be non infectious.      Patient is not clinically better.      Further management will depend on  the clinical course of the patient   Discussed with the nursing staff discussed with ER physician          Active Orders   LAB    Urinalysis, Routine     Frequency: STAT     Number of Occurrences: 1 Occurrences   Nourishments    Room Service Eligibility Until Discontinued     Frequency: Until Discontinued     Number of Occurrences: Until Specified    Room Service Notify RN Until Discontinued     Frequency: Until Discontinued     Number of Occurrences: Until Specified   Microbiology    Clostridium difficile(toxigenic)PCR     Frequency: Once     Number of Occurrences: 1 Occurrences    GI Stool panel by PCR     Frequency: Once     Number of Occurrences: 1 Occurrences   Nursing    Accucheck     Frequency: Once     Number of Occurrences: 1 Occurrences    Accucheck     Frequency: TID AC and HS     Number of Occurrences: Until Specified    Cardiac monitoring     Frequency: Continuous     Number of Occurrences: 2 Hours     Order Comments: ED holding order only  Cancel if other admission orders exist!        Cardiac monitoring     Frequency: Until Discontinued     Number of Occurrences: Until Specified    Hypoglycemia Protocol for Adults     Frequency: PRN     Number of Occurrences: Until Specified     Order Comments: blood glucose < 70      Initiate Oxygen Protocol     Frequency: Until Discontinued     Number of Occurrences: 2 Hours    Notify physician (specify)     Frequency: Until Discontinued     Number of Occurrences: Until Specified    Pulse oximetry     Frequency: Continuous     Number of Occurrences: 2 Hours     Order Comments: ED holding order only  Cancel if other admission orders exist!        Vital signs     Frequency: Per Unit Routine     Number of Occurrences: 2 Hours     Order Comments: ED holding order only  Cancel if other admission orders exist!       Consult    ED Consult to Cardiology     Frequency: Once     Number of Occurrences: 1 Occurrences   Isolation    Enteric/Contact PLUS Isolation Continuous      Frequency: Continuous     Number of Occurrences: Until Specified   Medications    sodium chloride 0.9 % IV bolus 2,370 mL     Frequency: Continuous     Dose: 30 mL/kg     Route: Intravenous   Cardiology    CARD ECHO 2D DOPPLER (CPT=93306)     Frequency: Once     Number of Occurrences: 1 Occurrences       Junaid Gill MD  1/12/2025          [1]   Allergies  Allergen Reactions    Codeine HIVES

## 2025-01-12 NOTE — ED PROVIDER NOTES
Patient Seen in: [facility] Emergency Department    History     Chief Complaint   Patient presents with    Nausea/Vomiting/Diarrhea    Cough/URI     Stated Complaint:      HPI    84-year-old male with past medical history of COPD, CAD on Plavix, atrial fibrillation on edoxaban presenting via EMS from home with wife for evaluation of vomiting/diarrhea since 4 AM associated with pleuritic left-sided chest pain.  Patient with COVID infection December 16, wife recently with RSV.  No other sick contacts recent travel.  No shortness of breath.  Recent cough from COVID improving without sputum production.  No urinary complaints, no abdominal pain.  No recent travel.  No new food ingestions or recent antibiotics.  Patient with primary care at [facility], 4/2018 TTE with diastolic dysfunction and preserved ejection fraction.    Past Medical History:    COPD (chronic obstructive pulmonary disease) (HCC)       History reviewed. No pertinent surgical history.         No family history on file.    Social History     Socioeconomic History    Marital status:    Tobacco Use    Smoking status: Former     Types: Cigarettes     Passive exposure: Past    Smokeless tobacco: Never   Vaping Use    Vaping status: Never Used   Substance and Sexual Activity    Alcohol use: Not Currently    Drug use: Never       Review of Systems :  Constitutional: As per HPI  Respiratory: Negative for cough and shortness of breath.    Cardiovascular: Positive for chest pain.    Gastrointestinal: Positive for vomiting/diarrhea.    Positive for stated complaint:   Other systems are as noted in HPI.  Constitutional and vital signs reviewed.      All other systems reviewed and negative except as noted above.    PSFH elements reviewed from today and agreed except as otherwise stated in HPI.    Physical Exam     ED Triage Vitals   BP 01/11/25 1923 125/60   Pulse 01/11/25 1923 89   Resp 01/11/25 1923 20   Temp 01/11/25 1926 (!) 101.2 °F  (38.4 °C)   Temp src 01/11/25 1926 Oral   SpO2 01/11/25 1923 96 %   O2 Device 01/11/25 1923 None (Room air)       Current:/60   Pulse 89   Resp 20   Wt 79 kg   SpO2 96%         Physical Exam   Constitutional: No distress.   HEENT: Dry MM.  Head: Normocephalic.   Eyes: No injection.  No photophobia.  Neck: Neck supple.  No meningismus.  Cardiovascular: Irregular.   Pulmonary/Chest: Effort normal. CTAB.  Abdominal: Soft.  Nontender.  Musculoskeletal: No gross deformity.  Neurological: Alert.   Skin: Skin is warm.   Psychiatric: Cooperative.  Nursing note and vitals reviewed.        ED Course     Labs Reviewed   CBC WITH DIFFERENTIAL WITH PLATELET - Abnormal; Notable for the following components:       Result Value    RBC 3.58 (*)     HGB 9.4 (*)     HCT 31.4 (*)     MCHC 29.9 (*)     RDW-SD 48.8 (*)     RDW 15.2 (*)     .0 (*)     Lymphocyte Absolute 0.51 (*)     All other components within normal limits   BASIC METABOLIC PANEL (8) - Abnormal; Notable for the following components:    Glucose 132 (*)     Calculated Osmolality 302 (*)     All other components within normal limits   TROPONIN I HIGH SENSITIVITY - Abnormal; Notable for the following components:    Troponin I (High Sensitivity) 132 (*)     All other components within normal limits   D-DIMER - Abnormal; Notable for the following components:    D-Dimer 1.64 (*)     All other components within normal limits   PRO BETA NATRIURETIC PEPTIDE - Abnormal; Notable for the following components:    Pro-Beta Natriuretic Peptide 9,711 (*)     All other components within normal limits   PROCALCITONIN - Abnormal; Notable for the following components:    Procalcitonin 4.98 (*)     All other components within normal limits   LIPID PANEL - Abnormal; Notable for the following components:    HDL Cholesterol 34 (*)     All other components within normal limits   TROPONIN I HIGH SENSITIVITY - Abnormal; Notable for the following components:    Troponin I (High  Sensitivity) 181 (*)     All other components within normal limits   POCT GLUCOSE - Abnormal; Notable for the following components:    POC Glucose  128 (*)     All other components within normal limits   POCT GLUCOSE - Abnormal; Notable for the following components:    POC Glucose  118 (*)     All other components within normal limits   LACTIC ACID, PLASMA - Normal   PTT, ACTIVATED - Normal   SARS-COV-2/FLU A AND B/RSV BY PCR (GENEXPERT) - Normal    Narrative:     This test is intended for the qualitative detection and differentiation of SARS-CoV-2, influenza A, influenza B, and respiratory syncytial virus (RSV) viral RNA in nasopharyngeal or nares swabs from individuals suspected of respiratory viral infection consistent with COVID-19 by their healthcare provider. Signs and symptoms of respiratory viral infection due to SARS-CoV-2, influenza, and RSV can be similar.    Test performed using the Xpert Xpress SARS-CoV-2/FLU/RSV (real time RT-PCR)  assay on the [employer] instrument, [address], [place] [address].   This test is being used under the Food and Drug Administration's Emergency Use Authorization.    The authorized Fact Sheet for Healthcare Providers for this assay is available upon request from the laboratory.   URINALYSIS, ROUTINE   HEMOGLOBIN A1C   TYPE AND SCREEN    Narrative:     The following orders were created for panel order Type and screen.  Procedure                               Abnormality         Status                     ---------                               -----------         ------                     ABORH (Blood Type)[[id number]]                               Final result               Antibody Screen[[id number]]                                  Final result                 Please view results for these tests on the individual orders.   ABORH (BLOOD TYPE)   ANTIBODY SCREEN   ABORH CONFIRMATION   RAINBOW DRAW LAVENDER   RAINBOW DRAW LIGHT GREEN   RAINBOW DRAW BLUE   RAINBOW DRAW GOLD    BLOOD CULTURE   BLOOD CULTURE   GI STOOL PANEL BY PCR   C. DIFFICILE(TOXIGENIC)PCR     EKG    Rate, intervals and axes as noted on EKG Report.  Rate: 84  Rhythm: Atrial Fibrillation  Reading: A-fib 84 with septal ST depression, no prior for comparison as independently interpreted myself     EKG(repeat/2hr)    Rate, intervals and axes as noted on EKG Report.  Rate: 81  Rhythm: NSR  Reading: NSR 81 without ST elevation as independently interpreted by myself         CT CHEST PE AORTA (IV ONLY) (CPT=71260)    Result Date: 1/11/2025  PROCEDURE: CT CHEST PE AORTA (IV ONLY) (CPT=71260)  COMPARISON: None.  INDICATIONS: Chest pain.  pleurisy; elevated dimer  TECHNIQUE: CT images of the chest were obtained with non-ionic intravenous contrast material.  Automated exposure control for dose reduction was used. Adjustment of the mA and/or kV was done based on the patient's size. Use of iterative reconstruction technique for dose reduction was used. Dose information is transmitted to the ACR (American College of Radiology) NRDR (National Radiology Data Registry) which includes the Dose Index Registry.  FINDINGS:  CARDIAC: Multivessel coronary artery calcification post coronary artery bypass.  Aortic valve calcification. MEDIASTINUM/SAAD: Mild right paratracheal lymphadenopathy-13 x 16 mm and precarinal lymphadenopathy 14 x 20 mm.  PULMONARY ARTERIES: Normal. No evidence for pulmonary embolus no pulmonary embolus through proximal subsegmental level..  AORTA: Atherosclerotic calcification.  Aortic root -4.3 cm and ascending aorta -4.5 cm No dissection. LUNGS/PLEURA: Bibasilar pleural effusions with pulmonary interstitial edema.  Compressive atelectasis in both lower lobes. CHEST WALL: Loop recorder left chest wall.  No mass or axillary adenopathy.  LIMITED ABDOMEN: Post cholecystectomy.  Splenomegaly with AP diameter of 16.3 cm. BONES: Post sternotomy.  No acute fracture or suspicious bone lesion.  OTHER: Negative.           CONCLUSION:  1. No pulmonary embolus. 2. CHF with pulmonary interstitial edema and bibasilar pleural effusions. 3. Coronary artery disease post coronary bypass. 4. Aortic valve calcification.  Mildly dilated aorta-ascending aorta 4.5 cm, aortic root 4.3 cm. 5. Mild nonspecific paratracheal and precarinal lymphadenopathy. 6. Incompletely evaluated moderate splenomegaly .    Dictated by (CST): Nate Beck MD on 1/11/2025 at 10:51 PM     Finalized by (CST): Nate Beck MD on 1/11/2025 at 11:02 PM          XR CHEST AP PORTABLE  (CPT=71045)    Result Date: 1/11/2025  PROCEDURE: XR CHEST AP PORTABLE  (CPT=71045) TIME: 1955 hours  COMPARISON: None.  INDICATIONS: Kavitha has a cough and chest pain.  TECHNIQUE:   Single view.   FINDINGS:  CARDIAC/VASC: Post coronary artery bypass. Cardiomegaly. Pulmonary venous congestion.  MEDIAST/SAAD:   No visible mass or adenopathy. LUNGS/PLEURA: No consolidation or pleural effusion. BONES: Post sternotomy.  Chronic right rotator cuff tear.  Old right-sided rib fracture. OTHER: Loop recorder left chest wall.         CONCLUSION:  1. No pneumonia. 2. Cardiomegaly with mild pulmonary venous congestion.    Dictated by (CST): Nate Beck MD on 1/11/2025 at 8:58 PM     Finalized by (CST): Nate Beck MD on 1/11/2025 at 8:59 PM                  [facility]  part of [facility]      Sepsis Reassessment Note    BP 99/60 (BP Location: Right arm)   Pulse 82   Temp 100.2 °F (37.9 °C) (Oral)   Resp (!) 28   Wt 78 kg   SpO2 93%      I completed the sepsis reassessment at 2247    Cardiac:  Regularity: Regular  Rate: Normal  Heart Sounds: S1,S2    Lungs:   Right: Rales  Left: Rhonchi    Peripheral Pulses:  Radial: Right 1+ or Left 1+      Capillary Refill:  <3 Secs    Skin:  Temp/Moisture: Warm and Dry  Color: Normal      Pedrito Grande MD  1/12/2025  12:02 AM         [facility]   DIFFERENTIAL DIAGNOSIS: After history and physical exam differential diagnosis includes but  is not limited to viral syndrome, COVID-19, influenza, norovirus, ACS, myocarditis, VTE, pneumonia, pleural effusion, empyema, UTI, sepsis, bacteremia.    Pulse ox: 96%:Abnormal and Improved after treatment on nasal cannula, as independently interpreted by myself    Cardiac Monitor Interpretation:   Pulse Readings from Last 1 Encounters:   01/11/25 89   , atrial fibrillation,      Medical Decision Making  Evaluation for vomiting/diarrhea now with left-sided pleurisy in setting of recent COVID infection/cough with fever/hypoxemia noted.  Chest x-ray/BNP as noted in addition to troponin for which aspirin initiated though with elevated dimer which CT chest obtained and without PE.  Fever without leukocytosis in setting of normal lactic noted though with elevated procalcitonin for which blood cultures sent and empiric ceftriaxone initiated pending urinalysis; case discussed with on-call medicine Dr. Gill for admission and [place] cardiology Dr. Espinoza.      I was wearing at minimum a facemask and eye protection throughout this encounter with handwashing performed prior and after patient evaluation without personal hand/facial/oropharyngeal contact and gloves worn throughout encounter. See note and/or contact this provider for further PPE details.    We recommend that you schedule follow up care with a primary care provider within the next three months to obtain basic health screening including reassessment of your blood pressure.      You had elevated blood pressure today and you need to follow up with your doctor for a repeat blood pressure check and further discussion of lifestyle modifications that include Weight Reduction - Dietary Sodium Restriction - Increased Physical Activity and Moderation in alcohol (ETOH) Consumption. If possible check your pressure at home and keep a blood pressure log to bring to your physician.  Disposition and Plan     Clinical Impression:  1. Acute febrile illness    2. Acute  hypoxemic respiratory failure (HCC)    3. Elevated troponin    4. Vomiting and diarrhea    5. SIRS (systemic inflammatory response syndrome) (HCC)        Disposition:  Admit    Follow-up:  No follow-up provider specified.    Medications Prescribed:  Current Discharge Medication List

## 2025-01-12 NOTE — PROGRESS NOTES
Orders for admission, patient is aware of plan and ready to go upstairs. Any questions, please call ED JAY Babcock at extension [phone].     Patient Covid vaccination status: Unvaccinated     COVID Test Ordered in ED: SARS-CoV-2/Flu A and B/RSV by PCR (GeneXpert)    COVID Suspicion at Admission: N/A    Running Infusions:    sodium chloride      None    Mental Status/LOC at time of transport: a&ox4    Other pertinent information:   CIWA score: N/A   NIH score:  N/A

## 2025-01-12 NOTE — ED INITIAL ASSESSMENT (HPI)
Pt arrives via EMS from [place] [facility] w/co N/V/D, low O2, fevers and \"flu like symptoms\" x 1 day. Pt endorsees hx of COPD and only wears O2 @ 2L at night.   Upon arrival pt a&ox4, neuro intact, able to answer all questions w/o difficulty or sob.  SPO2 upon arrival 84-88% on RA. Intervention implemented, placed patient on NC @ 1l to maintain O2 sat greater than 90-92%, vitals otherwise WNL, breathing non-labored wi/regular rate and rhythm.

## 2025-01-13 LAB
ANION GAP SERPL CALC-SCNC: 6 MMOL/L (ref 0–18)
BASOPHILS # BLD AUTO: 0.01 X10(3) UL (ref 0–0.2)
BASOPHILS NFR BLD AUTO: 0.2 %
BUN BLD-MCNC: 16 MG/DL (ref 9–23)
BUN/CREAT SERPL: 16.2 (ref 10–20)
CALCIUM BLD-MCNC: 8.4 MG/DL (ref 8.7–10.4)
CHLORIDE SERPL-SCNC: 108 MMOL/L (ref 98–112)
CO2 SERPL-SCNC: 26 MMOL/L (ref 21–32)
CREAT BLD-MCNC: 0.99 MG/DL
DEPRECATED RDW RBC AUTO: 51.4 FL (ref 35.1–46.3)
EGFRCR SERPLBLD CKD-EPI 2021: 76 ML/MIN/1.73M2 (ref 60–?)
EOSINOPHIL # BLD AUTO: 0.27 X10(3) UL (ref 0–0.7)
EOSINOPHIL NFR BLD AUTO: 5.3 %
ERYTHROCYTE [DISTWIDTH] IN BLOOD BY AUTOMATED COUNT: 15.4 % (ref 11–15)
GLUCOSE BLD-MCNC: 103 MG/DL (ref 70–99)
GLUCOSE BLDC GLUCOMTR-MCNC: 112 MG/DL (ref 70–99)
GLUCOSE BLDC GLUCOMTR-MCNC: 147 MG/DL (ref 70–99)
GLUCOSE BLDC GLUCOMTR-MCNC: 160 MG/DL (ref 70–99)
GLUCOSE BLDC GLUCOMTR-MCNC: 174 MG/DL (ref 70–99)
HCT VFR BLD AUTO: 29.9 %
HGB BLD-MCNC: 8.7 G/DL
IMM GRANULOCYTES # BLD AUTO: 0.02 X10(3) UL (ref 0–1)
IMM GRANULOCYTES NFR BLD: 0.4 %
LYMPHOCYTES # BLD AUTO: 1.16 X10(3) UL (ref 1–4)
LYMPHOCYTES NFR BLD AUTO: 22.6 %
MCH RBC QN AUTO: 26.5 PG (ref 26–34)
MCHC RBC AUTO-ENTMCNC: 29.1 G/DL (ref 31–37)
MCV RBC AUTO: 91.2 FL
MONOCYTES # BLD AUTO: 0.76 X10(3) UL (ref 0.1–1)
MONOCYTES NFR BLD AUTO: 14.8 %
NEUTROPHILS # BLD AUTO: 2.91 X10 (3) UL (ref 1.5–7.7)
NEUTROPHILS # BLD AUTO: 2.91 X10(3) UL (ref 1.5–7.7)
NEUTROPHILS NFR BLD AUTO: 56.7 %
OSMOLALITY SERPL CALC.SUM OF ELEC: 291 MOSM/KG (ref 275–295)
PLATELET # BLD AUTO: 127 10(3)UL (ref 150–450)
PLATELETS.RETICULATED NFR BLD AUTO: 4.5 % (ref 0–7)
POTASSIUM SERPL-SCNC: 4 MMOL/L (ref 3.5–5.1)
RBC # BLD AUTO: 3.28 X10(6)UL
SODIUM SERPL-SCNC: 140 MMOL/L (ref 136–145)
WBC # BLD AUTO: 5.1 X10(3) UL (ref 4–11)

## 2025-01-13 PROCEDURE — 80048 BASIC METABOLIC PNL TOTAL CA: CPT | Performed by: INTERNAL MEDICINE

## 2025-01-13 PROCEDURE — 82962 GLUCOSE BLOOD TEST: CPT

## 2025-01-13 PROCEDURE — 85025 COMPLETE CBC W/AUTO DIFF WBC: CPT | Performed by: INTERNAL MEDICINE

## 2025-01-13 RX ORDER — PANTOPRAZOLE SODIUM 40 MG/1
40 TABLET, DELAYED RELEASE ORAL
Status: DISCONTINUED | OUTPATIENT
Start: 2025-01-13 | End: 2025-01-15

## 2025-01-13 RX ORDER — LEVOTHYROXINE SODIUM 125 UG/1
125 TABLET ORAL
Status: DISCONTINUED | OUTPATIENT
Start: 2025-01-13 | End: 2025-01-15

## 2025-01-13 NOTE — PROGRESS NOTES
[facility]    Progress Note    Joshua Liz . Patient Status:  Inpatient    1940 MRN [id number]   Location [facility] 3W/SW Attending Junaid Gill MD   Hosp Day # 2 PCP No primary care provider on file.       SUBJECTIVE:  Feeling better.  Denies any fever no chills no diarrhea.  Patient reports to me that his wife is sick now with the same complaints.      He does not want any physical therapy patient tells me he walks with a walker at home    OBJECTIVE:  Vital signs in last 24 hours:  BP 98/54 (BP Location: Left arm)   Pulse 69   Temp (!) 100.5 °F (38.1 °C) (Oral)   Resp 20   Ht 5' 8\" (1.727 m)   Wt 176 lb 11.2 oz (80.2 kg)   SpO2 95%   BMI 26.87 kg/m²     Intake/Output:    Intake/Output Summary (Last 24 hours) at 2025 0645  Last data filed at 2025 0100  Gross per 24 hour   Intake 360 ml   Output 350 ml   Net 10 ml       Wt Readings from Last 3 Encounters:   25 176 lb 11.2 oz (80.2 kg)       Exam  Gen: No acute distress, alert and oriented x3,  HEENT:   Pulm: Lungs clear, normal respiratory effort  CV: Heart with regular rate and rhythm, no peripheral edema  Abd: Abdomen soft, nontender, nondistended, no organomegaly, bowel sounds present  MSK: Full range of motion in extremities, no clubbing, no cyanosis  Skin: no rashes or lesions  CNS:  alert    Data Review:     Labs:   Lab Results   Component Value Date    WBC 3.4 2025    HGB 8.7 2025    HCT 29.7 2025    .0 2025    CREATSERUM 1.03 2025    BUN 16 2025     2025    K 4.0 2025     2025    CO2 25.0 2025     2025    CA 8.5 2025       LABS  Recent Labs   Lab 25  1929 25  0716   RBC 3.58* 3.30*   HGB 9.4* 8.7*   HCT 31.4* 29.7*   MCV 87.7 90.0   MCH 26.3 26.4   MCHC 29.9* 29.3*   RDW 15.2* 15.2*   NEPRELIM 4.59 2.27   WBC 5.6 3.4*   .0* 110.0*   PTT 32.6  --     DDIMER 1.64*  --    * 119*       Imaging:      Meds:   Current Facility-Administered Medications   Medication Dose Route Frequency    ipratropium-albuterol (Duoneb) 0.5-2.5 (3) MG/3ML inhalation solution 3 mL  3 mL Nebulization Q4H PRN    acetaminophen (Tylenol) tab 650 mg  650 mg Oral Q6H PRN    cefTRIAXone (Rocephin) 2 g in sodium chloride 0.9% 100 mL IVPB-ADDV  2 g Intravenous Q24H    lisinopril (Zestril) tab 10 mg  10 mg Oral Daily    clopidogrel (Plavix) tab 75 mg  75 mg Oral Daily    apixaban (Eliquis) tab 5 mg  5 mg Oral BID    atorvastatin (Lipitor) tab 40 mg  40 mg Oral Nightly    carvedilol (Coreg) tab 3.125 mg  3.125 mg Oral BID with meals    miconazole 2 % powder   Topical PRN    glucose (Dex4) 15 GM/59ML oral liquid 15 g  15 g Oral Q15 Min PRN    Or    glucose (Glutose) 40% oral gel 15 g  15 g Oral Q15 Min PRN    Or    glucose-vitamin C (Dex-4) chewable tab 4 tablet  4 tablet Oral Q15 Min PRN    Or    dextrose 50% injection 50 mL  50 mL Intravenous Q15 Min PRN    Or    glucose (Dex4) 15 GM/59ML oral liquid 30 g  30 g Oral Q15 Min PRN    Or    glucose (Glutose) 40% oral gel 30 g  30 g Oral Q15 Min PRN    Or    glucose-vitamin C (Dex-4) chewable tab 8 tablet  8 tablet Oral Q15 Min PRN    insulin aspart (NovoLOG) 100 Units/mL FlexPen 1-5 Units  1-5 Units Subcutaneous TID CC       Assessment  Patient Active Problem List   Diagnosis    Acute febrile illness    Acute hypoxemic respiratory failure (HCC)    Elevated troponin    Acute febrile illness  Could be viral syndrome.    Chest x-ray does not reveal any pneumonia.    UA is also negative.     Will follow him clinically.    Patient was empirically started on ceftriaxone by the emergency room physician.  Will continue for now.       The       Acute hypoxemic respiratory failure (HCC)   Continue supportive care could be from pulmonary edema.            Elevated troponin  Could be from demand ischemia.    Also type 2 myocardial infarction.    Consult  Cardiology.       Paroxysmal atrial fibrillation.  Continue current treatment.    Follow Cardiology recommendations.       Acute on chronic congestive heart failure with preserved ejection fraction  Patient was given Lasix.  Will follow him clinically.       Coronary artery disease stable.       Nausea vomiting and diarrhea.   resolved  Most likely acute gastroenteritis could is Lucy a viral gastroenteritis could also be non infectious.       Patient is not clinically better.       Further management will depend on the clinical course of the patient   Discussed with the nursing staff discussed with ER physician       Plan:   As above  Active Orders   LAB    Basic Metabolic Panel (8)     Frequency: Tomorrow AM draw     Number of Occurrences: 1 Occurrences    CBC With Differential With Platelet     Frequency: Tomorrow AM draw     Number of Occurrences: 1 Occurrences   Nourishments    Room Service Eligibility Until Discontinued     Frequency: Until Discontinued     Number of Occurrences: Until Specified    Room Service Notify RN Until Discontinued     Frequency: Until Discontinued     Number of Occurrences: Until Specified   Microbiology    Clostridium difficile(toxigenic)PCR     Frequency: Once     Number of Occurrences: 1 Occurrences    GI Stool panel by PCR     Frequency: Once     Number of Occurrences: 1 Occurrences   Diet    Cardiac diet Cardiac; Is Patient on Accuchecks? Yes     Frequency: Effective Now     Number of Occurrences: Until Specified   Nursing    Accucheck     Frequency: Once     Number of Occurrences: 1 Occurrences    Accucheck     Frequency: TID AC and HS     Number of Occurrences: Until Specified    Accucheck     Frequency: PRN     Number of Occurrences: Until Specified     Order Comments: For symptoms or suspicion of hypoglycemia      Cardiac monitoring     Frequency: Continuous     Number of Occurrences: 2 Hours     Order Comments: ED holding order only  Cancel if other admission orders  exist!        Cardiac monitoring     Frequency: Until Discontinued     Number of Occurrences: Until Specified    Discontinue all oral diabetic agents     Frequency: Once     Number of Occurrences: 1 Occurrences    Hypoglycemia Protocol for Adults     Frequency: PRN     Number of Occurrences: Until Specified     Order Comments: blood glucose < 70      Initiate Hypoglycemia Algorithm for Adults     Frequency: Until Discontinued     Number of Occurrences: Until Specified     Order Comments: For blood glucose less than 70      Initiate Oxygen Protocol     Frequency: Until Discontinued     Number of Occurrences: 2 Hours    Notify physician (specify)     Frequency: Until Discontinued     Number of Occurrences: Until Specified    Patient may continue to wear home continuous glucose monitor     Frequency: Until Discontinued     Number of Occurrences: Until Specified     Order Comments: Wear until discontinued or at recommended end of wear. Accu-Cheks must still be performed and results of Accu-Chek must be used for dosing all insulin and treatment of hypoglycemia.      Patient to remove home continuous glucose monitor prior to any imaging procedure     Frequency: Until Discontinued     Number of Occurrences: Until Specified     Order Comments: Patient must remove home continuous glucose monitor for:  1. MRI, CT, Nuclear Stress test, Pacemaker/AICD reprogramming, Therapeutic radiation - Oncology, Cardiac Cath, ERCP  2. X-ray - patient must remove any home continuous glucose monitor EXCEPT Dexcom unless in field of view  3. When removed, place CGM in ziplock bag with patient label and give to family member or store in a secure location - DO NOT THROW AWAY  4. Unless in area of imaging, may remain on for colonoscopy, EKG, laser surgery, ultrasound      Please communicate patient's home diabetic medications when speaking with physician     Frequency: Once     Number of Occurrences: 1 Occurrences    Prior to surgery, home  continuous glucose monitor must be removed by the patient.     Frequency: Until Discontinued     Number of Occurrences: Until Specified    Provide diabetes patient education guide     Frequency: Once     Number of Occurrences: 1 Occurrences     Order Comments: Initiate education for new onset diabetes or pre-existing diabetes with knowledge deficits.      Pulse oximetry     Frequency: Continuous     Number of Occurrences: 2 Hours     Order Comments: ED holding order only  Cancel if other admission orders exist!        Teach blood glucose monitoring with bedside glucometer     Frequency: Once     Number of Occurrences: 1 Occurrences     Order Comments: If patient does not have a home glucometer, notify physician to order for discharge home.      Vital signs     Frequency: Per Unit Routine     Number of Occurrences: 2 Hours     Order Comments: ED holding order only  Cancel if other admission orders exist!       Consult    ED Consult to Cardiology     Frequency: Once     Number of Occurrences: 1 Occurrences   Isolation    [employer]/Contact PLUS Isolation Continuous     Frequency: Continuous     Number of Occurrences: Until Specified   Medications    acetaminophen (Tylenol) tab 650 mg     Frequency: Q6H PRN     Dose: 650 mg     Route: Oral    apixaban (Eliquis) tab 5 mg     Frequency: BID     Dose: 5 mg     Route: Oral    atorvastatin (Lipitor) tab 40 mg     Frequency: Nightly     Dose: 40 mg     Route: Oral    carvedilol (Coreg) tab 3.125 mg     Frequency: BID with meals     Dose: 3.125 mg     Route: Oral    cefTRIAXone (Rocephin) 2 g in sodium chloride 0.9% 100 mL IVPB-ADDV     Frequency: Q24H     Dose: 2 g     Route: Intravenous    clopidogrel (Plavix) tab 75 mg     Frequency: Daily     Dose: 75 mg     Route: Oral    dextrose 50% injection 50 mL     Linked Order: Or     Frequency: Q15 Min PRN     Dose: 50 mL     Route: Intravenous    glucose (Dex4) 15 GM/59ML oral liquid 15 g     Linked Order: Or     Frequency: Q15 Min  PRN     Dose: 15 g     Route: Oral    glucose (Dex4) 15 GM/59ML oral liquid 30 g     Linked Order: Or     Frequency: Q15 Min PRN     Dose: 30 g     Route: Oral    glucose (Glutose) 40% oral gel 15 g     Linked Order: Or     Frequency: Q15 Min PRN     Dose: 15 g     Route: Oral    glucose (Glutose) 40% oral gel 30 g     Linked Order: Or     Frequency: Q15 Min PRN     Dose: 30 g     Route: Oral    glucose-vitamin C (Dex-4) chewable tab 4 tablet     Linked Order: Or     Frequency: Q15 Min PRN     Dose: 4 tablet     Route: Oral    glucose-vitamin C (Dex-4) chewable tab 8 tablet     Linked Order: Or     Frequency: Q15 Min PRN     Dose: 8 tablet     Route: Oral    insulin aspart (NovoLOG) 100 Units/mL FlexPen 1-5 Units     Frequency: TID CC     Dose: 1-5 Units     Route: Subcutaneous     Order Comments: For insulin aspart (NovoLOG) doses > 60 Units, change product to dispense to be a insulin aspart (NovoLOG) *VIAL*    ipratropium-albuterol (Duoneb) 0.5-2.5 (3) MG/3ML inhalation solution 3 mL     Frequency: Q4H PRN     Dose: 3 mL     Route: Nebulization    lisinopril (Zestril) tab 10 mg     Frequency: Daily     Dose: 10 mg     Route: Oral    miconazole 2 % powder     Frequency: PRN     Route: Topical       Junaid Gill MD  1/13/2025

## 2025-01-13 NOTE — PLAN OF CARE
Problem: Patient Centered Care  Goal: Patient preferences are identified and integrated in the patient's plan of care  Description: Interventions:  - What would you like us to know as we care for you? Patient is from [facility].  - Provide timely, complete, and accurate information to patient/family  - Incorporate patient and family knowledge, values, beliefs, and cultural backgrounds into the planning and delivery of care  - Encourage patient/family to participate in care and decision-making at the level they choose  - Honor patient and family perspectives and choices  Outcome: Progressing     Problem: Patient/Family Goals  Goal: Patient/Family Long Term Goal  Description: Patient's Long Term Goal: stay out of the hospital    Interventions:  - ensure medication compliance  - follow up with MD  - See additional Care Plan goals for specific interventions  Outcome: Progressing  Goal: Patient/Family Short Term Goal  Description: Patient's Short Term Goal: feel better    Interventions:   - monitor vitals and temp  - iv antibiotics per md order  - See additional Care Plan goals for specific interventions  Outcome: Progressing     Problem: RESPIRATORY - ADULT  Goal: Achieves optimal ventilation and oxygenation  Description: INTERVENTIONS:  - Assess for changes in respiratory status  - Assess for changes in mentation and behavior  - Position to facilitate oxygenation and minimize respiratory effort  - Oxygen supplementation based on oxygen saturation or ABGs  - Provide Smoking Cessation handout, if applicable  - Encourage broncho-pulmonary hygiene including cough, deep breathe, Incentive Spirometry  - Assess the need for suctioning and perform as needed  - Assess and instruct to report SOB or any respiratory difficulty  - Respiratory Therapy support as indicated  - Manage/alleviate anxiety  - Monitor for signs/symptoms of CO2 retention  Outcome: Progressing     Problem: GASTROINTESTINAL - ADULT  Goal:  Minimal or absence of nausea and vomiting  Description: INTERVENTIONS:  - Maintain adequate hydration with IV or PO as ordered and tolerated  - Nasogastric tube to low intermittent suction as ordered  - Evaluate effectiveness of ordered antiemetic medications  - Provide nonpharmacologic comfort measures as appropriate  - Advance diet as tolerated, if ordered  - Obtain nutritional consult as needed  - Evaluate fluid balance  Outcome: Progressing  Goal: Maintains or returns to baseline bowel function  Description: INTERVENTIONS:  - Assess bowel function  - Maintain adequate hydration with IV or PO as ordered and tolerated  - Evaluate effectiveness of GI medications  - Encourage mobilization and activity  - Obtain nutritional consult as needed  - Establish a toileting routine/schedule  - Consider collaborating with pharmacy to review patient's medication profile  Outcome: Progressing     Problem: METABOLIC/FLUID AND ELECTROLYTES - ADULT  Goal: Glucose maintained within prescribed range  Description: INTERVENTIONS:  - Monitor Blood Glucose as ordered  - Assess for signs and symptoms of hyperglycemia and hypoglycemia  - Administer ordered medications to maintain glucose within target range  - Assess barriers to adequate nutritional intake and initiate nutrition consult as needed  - Instruct patient on self management of diabetes  Outcome: Progressing  Goal: Electrolytes maintained within normal limits  Description: INTERVENTIONS:  - Monitor labs and rhythm and assess patient for signs and symptoms of electrolyte imbalances  - Administer electrolyte replacement as ordered  - Monitor response to electrolyte replacements, including rhythm and repeat lab results as appropriate  - Fluid restriction as ordered  - Instruct patient on fluid and nutrition restrictions as appropriate  Outcome: Progressing

## 2025-01-13 NOTE — PROGRESS NOTES
[facility]  part of [facility]    Cardiology Hospital Progress Note       Joshua Liz . Patient Status:  Inpatient    1940 MRN [id number]   Location [facility] 3W/SW Attending Junaid Gill MD   Hosp Day # 2 PCP No primary care provider on file.       Patient is a 84 year old male who was admitted to the hospital for Acute febrile illness:  Subjective:  Patient seen in the room, sitting up and having meal  No cardiac c/o    Patient states he recently had COVID pneumonia with Pleuritic chest pains  Had C cath in 2024, per patient he had 4 out of 5 grafts occluded and one front graft possibly DURAN to LAD was patent, this was @ [facility] and he was seen by [facility] cardiologist who stated his CAD is not amenable to PCI and recommended medical Rx.  Currently patient senies any cardiac ischemic s/s  Clinically euvolemic    Patient had ECHO as noted with LV systolic dysfunction   CTA as noted     Past Medical History:   Past Medical History:    COPD (chronic obstructive pulmonary disease) ([facility])    Diabetes (HCC)    High blood pressure    High cholesterol    Peripheral neuropathy    Sleep apnea       Past Surgical History:  Past Surgical History:   Procedure Laterality Date    Cabg         Family History:  No family history on file.    Social History:  Pediatric History   Patient Parents    Not on file     Other Topics Concern    Not on file   Social History Narrative    Not on file           Current Medications:  Current Facility-Administered Medications   Medication Dose Route Frequency    levothyroxine (Synthroid) tab 125 mcg  125 mcg Oral Daily @ 0700    pantoprazole (Protonix) DR tab 40 mg  40 mg Oral QAM AC    ipratropium-albuterol (Duoneb) 0.5-2.5 (3) MG/3ML inhalation solution 3 mL  3 mL Nebulization Q4H PRN    acetaminophen (Tylenol) tab 650 mg  650 mg Oral Q6H PRN    cefTRIAXone (Rocephin) 2 g in sodium chloride 0.9% 100 mL IVPB-ADDV  2 g Intravenous  Q24H    lisinopril (Zestril) tab 10 mg  10 mg Oral Daily    clopidogrel (Plavix) tab 75 mg  75 mg Oral Daily    apixaban (Eliquis) tab 5 mg  5 mg Oral BID    atorvastatin (Lipitor) tab 40 mg  40 mg Oral Nightly    carvedilol (Coreg) tab 3.125 mg  3.125 mg Oral BID with meals    miconazole 2 % powder   Topical PRN    glucose (Dex4) 15 GM/59ML oral liquid 15 g  15 g Oral Q15 Min PRN    Or    glucose (Glutose) 40% oral gel 15 g  15 g Oral Q15 Min PRN    Or    glucose-vitamin C (Dex-4) chewable tab 4 tablet  4 tablet Oral Q15 Min PRN    Or    dextrose 50% injection 50 mL  50 mL Intravenous Q15 Min PRN    Or    glucose (Dex4) 15 GM/59ML oral liquid 30 g  30 g Oral Q15 Min PRN    Or    glucose (Glutose) 40% oral gel 30 g  30 g Oral Q15 Min PRN    Or    glucose-vitamin C (Dex-4) chewable tab 8 tablet  8 tablet Oral Q15 Min PRN    insulin aspart (NovoLOG) 100 Units/mL FlexPen 1-5 Units  1-5 Units Subcutaneous TID CC     Medications Prior to Admission   Medication Sig    carboxymethylcellulose 1 % Ophthalmic Gel Place 1 drop into both eyes 4 (four) times daily.    carvedilol 3.125 MG Oral Tab Take 1 tablet (3.125 mg total) by mouth 2 (two) times daily with meals.    cetirizine 10 MG Oral Tab Take 1 tablet (10 mg total) by mouth daily.    cholecalciferol 1000 UNITS Oral Cap Take 1 capsule (1,000 Units total) by mouth daily.    docusate sodium 100 MG Oral Cap Take 1 capsule (100 mg total) by mouth 2 (two) times daily.    Edoxaban Tosylate 60 MG Oral Tab Take 60 mg by mouth daily.    empagliflozin 25 MG Oral Tab Take 0.5 tablets (12.5 mg total) by mouth daily.    fluticasone-salmeterol 250-50 MCG/ACT Inhalation Aerosol Powder, Breath Activated Inhale 1 puff into the lungs 2 (two) times daily.    fluticasone propionate 50 MCG/ACT Nasal Suspension 2 sprays by Each Nare route daily.    Glucagon HCl (GLUCAGON EMERGENCY) 1 MG/ML Injection Recon Soln Inject 1 mg as directed one time.    insulin aspart 100 Units/mL Subcutaneous  Solution Pen-injector Inject 8 Units into the skin daily with breakfast.    insulin aspart 100 Units/mL Subcutaneous Solution Pen-injector Inject 6 Units into the skin daily with dinner.    INSULIN GLARGINE MAX SOLOSTAR SC Inject 15 Units into the skin daily.    latanoprost 0.005 % Ophthalmic Solution Place 1 drop into both eyes nightly.    levothyroxine 125 MCG Oral Tab Take 1 tablet (125 mcg total) by mouth before breakfast.    Mirabegron ER 25 MG Oral Tablet 24 Hr Take 1 tablet (25 mg total) by mouth daily.    Multiple Vitamin (MULTIVITAMIN TAB/CAP) Take 1 tablet by mouth daily.    Nystatin Does not apply Powder     nystatin 028396 UNIT/GM External Powder Apply 1 Application topically 2 (two) times daily.    pantoprazole 40 MG Oral Tab EC Take 1 tablet (40 mg total) by mouth every morning before breakfast.    Potassium Chloride ER 10 MEQ Oral Tab CR Take 1 tablet (10 mEq total) by mouth daily.    primidone 50 MG Oral Tab Take 1 tablet (50 mg total) by mouth in the morning and 1 tablet (50 mg total) before bedtime.    rifAXIMin 550 MG Oral Tab Take 1 tablet (550 mg total) by mouth 2 (two) times daily.    semaglutide 8 MG/3ML Subcutaneous Solution Pen-injector Inject 2 mg into the skin once a week. On Saturdays    Sertraline HCl 200 MG Oral Cap Take 200 mg by mouth daily.    sodium chloride 0.65 % Nasal Solution 1 spray by Nasal route as needed for congestion.    tamsulosin 0.4 MG Oral Cap Take 1 capsule (0.4 mg total) by mouth at bedtime.    albuterol 108 (90 Base) MCG/ACT Inhalation Aero Soln Inhale 2 puffs into the lungs 4 (four) times daily as needed for Wheezing.    alendronate 70 MG Oral Tab Take 1 tablet (70 mg total) by mouth every 7 days.    atorvastatin 80 MG Oral Tab Take 1 tablet (80 mg total) by mouth daily.    benzonatate 100 MG Oral Cap Take 1 capsule (100 mg total) by mouth 3 (three) times daily as needed for cough.    carbamide peroxide 6.5 % Otic Solution Place 5 drops into both ears 3 (three)  times a week at 1600.    clopidogrel 75 MG Oral Tab Take 1 tablet (75 mg total) by mouth daily.       Allergies:  Allergies[1]    Review of Systems:   A comprehensive 12 point review of system was performed.  All pertinent positive and negative findings per HPI.    Physical Exam:   Blood pressure 95/55, pulse 66, temperature 98.5 °F (36.9 °C), temperature source Oral, resp. rate 18, height 5' 8\" (1.727 m), weight 176 lb 11.2 oz (80.2 kg), SpO2 93%.  Intake/Output:   Last 3 shifts: [id number]   This shift: I/O this shift:  In: 240 [P.O.:240]  Out: -      Vent Settings:      Hemodynamic parameters (last 24 hours):      Scheduled Meds:    levothyroxine  125 mcg Oral Daily @ 0700    pantoprazole  40 mg Oral QAM AC    cefTRIAXone  2 g Intravenous Q24H    lisinopril  10 mg Oral Daily    clopidogrel  75 mg Oral Daily    apixaban  5 mg Oral BID    atorvastatin  40 mg Oral Nightly    carvedilol  3.125 mg Oral BID with meals    insulin aspart  1-5 Units Subcutaneous TID CC       Continuous Infusions:       Physical Exam:    General: No acute distress.  HEENT: NAD  Neck: No JVD, no bruits.  Cardiac: Normal rate, RRR  Lungs: Occ basilar crackle  Abdomen: Soft, non-tender. BS+  Extremities: No edema.    Neurologic: Alert and moving all 4 extremities.  Psychiatry: Patient has calm affect.      Results:     Laboratory Data:  Lab Results   Component Value Date    WBC 5.1 01/13/2025    HGB 8.7 (L) 01/13/2025    HCT 29.9 (L) 01/13/2025    .0 (L) 01/13/2025    CREATSERUM 0.99 01/13/2025    BUN 16 01/13/2025     01/13/2025    K 4.0 01/13/2025     01/13/2025    CO2 26.0 01/13/2025     (H) 01/13/2025    CA 8.4 (L) 01/13/2025    PTT 32.6 01/11/2025    DDIMER 1.64 (H) 01/11/2025         Imaging:  EKG:  Sinus rhythm with marked sinus arrythmia with 1st degree A-V block  Nonspecific ST and T wave abnormality  Abnormal ECG  Since previous trace no significant changes     ECHO:  Conclusions:     1. Left ventricle:  The cavity size was normal. Wall thickness was normal.      Systolic function was markedly reduced. The estimated ejection fraction      was 30%, by biplane method of disks. Doppler parameters are consistent      with abnormal left ventricular relaxation - grade 1 diastolic      dysfunction.   2. Right ventricle: Systolic function was reduced.   3. Right atrium: The atrium was dilated.   4. Aortic valve: A bicuspid morphology cannot be excluded. The leaflets were      mildly thickened and mildly to moderately calcified. There was      thickening, consistent with sclerosis.   5. Aortic root: The aortic root was dilated and 4.0cm diameter.   6. Ascending aorta: The ascending aorta was dilated and 4.4cm diameter.   7. Mitral valve: The annulus was mildly to moderately calcified. The      leaflets were mildly thickened and mildly calcified.   Impressions:  No previous study from [facility] was   available for comparison.      IMPRESSION:  1. Sepsis syndrome -  Presentation concerning for sepsis rather than heart failure, given fever, cough, and elevated procalcitonin. Received IV fluids in ED with stabilization of BP and HR. Echocardiogram ordered and pending.     R65.20 Severe sepsis without septic shock     2. Possible superimposed congestive heart failure -  CXR with borderline heart failure changes and elevated BNP, but presentation more consistent with sepsis. Will hold off on further IV fluids at this time.  I50.9 Heart failure, unspecified  Ischemic cardiomyopathy     3. Elevated troponin with nonspecific ST/T changes -  Findings suggest type 2 myocardial injury in setting of sepsis syndrome and hypoxia. Will repeat troponin this morning.  Patient has known coronary artery disease with 4 out of 5 bypass grafts occluded.  Patient has been treated medically.     I21.A1 Myocardial infarction type 2     4. Coronary artery disease s/p CABG -  Patient with cardiac catheterization in December 2024 after  a non-ST elevation myocardial infarction demonstrating 4 out of 5 grafts being closed and only 1 graft being open he thinks the artery that goes to the front part of the heart.     I25.10 Atherosclerotic heart disease of native coronary artery without angina pectoris     5. Atrial fibrillation -  Subsequent ECG demonstrating sinus rhythm. On edoxaban previously, can restart if available or substitute with Eliquis.  I48.91 Unspecified atrial fibrillation    RECOMMENDATIONS:  Trend Troponins ( chronically elevated)  Rx of sepsis per PCP/ID  Continue current Rx   No need for cardiac w/u as no cardiac c/o   Await records from [facility] medical Rx  Will add Farxiga upon discharge    D/W patient, states he wants ONLY medical Rx    D/W nursing staff     Please do not hesitate to call with questions.    Orestes Dee MD  [facility]  [address]  [place] [address]  [phone]    This note was prepared using Dragon Medical voice recognition dictation software. As a result errors may occur. When identified these errors have been corrected. While every attempt is made to correct errors during dictation discrepancies may still exist        [1]   Allergies  Allergen Reactions    Codeine HIVES

## 2025-01-14 LAB
GLUCOSE BLDC GLUCOMTR-MCNC: 109 MG/DL (ref 70–99)
GLUCOSE BLDC GLUCOMTR-MCNC: 144 MG/DL (ref 70–99)
GLUCOSE BLDC GLUCOMTR-MCNC: 166 MG/DL (ref 70–99)
TROPONIN I SERPL HS-MCNC: 194 NG/L

## 2025-01-14 PROCEDURE — 82962 GLUCOSE BLOOD TEST: CPT

## 2025-01-14 PROCEDURE — 84484 ASSAY OF TROPONIN QUANT: CPT | Performed by: INTERNAL MEDICINE

## 2025-01-14 NOTE — PROGRESS NOTES
Patient denies any history of chest pain or shortness of breath. No abdominal pain or nausea. No headache. There is no cough or edema. Denies palpitations or dizziness. Has loose stools.    Vitals:    01/14/25 1504   BP: 110/65   Pulse: 69   Resp: 18   Temp: 98.3 °F (36.8 °C)       Intake/Output Summary (Last 24 hours) at 1/14/2025 1620  Last data filed at 1/14/2025 1400  Gross per 24 hour   Intake 710 ml   Output 600 ml   Net 110 ml     Wt Readings from Last 1 Encounters:   01/14/25 179 lb (81.2 kg)        General: No acute distress.  Neck: Jugular venous pulsations not seen.  Lungs: Clear to auscultation.  Heart: Normal rate. HONEY aortic area.  Abdomen: Soft. Non tender  Extremities: No edema.  Neurological: Alert. No focal deficits.  Psychiatric: Appropriate mood and affect.  Current Facility-Administered Medications   Medication Dose Route Frequency    levothyroxine (Synthroid) tab 125 mcg  125 mcg Oral Daily @ 0700    pantoprazole (Protonix) DR tab 40 mg  40 mg Oral QAM AC    ipratropium-albuterol (Duoneb) 0.5-2.5 (3) MG/3ML inhalation solution 3 mL  3 mL Nebulization Q4H PRN    acetaminophen (Tylenol) tab 650 mg  650 mg Oral Q6H PRN    cefTRIAXone (Rocephin) 2 g in sodium chloride 0.9% 100 mL IVPB-ADDV  2 g Intravenous Q24H    lisinopril (Zestril) tab 10 mg  10 mg Oral Daily    clopidogrel (Plavix) tab 75 mg  75 mg Oral Daily    apixaban (Eliquis) tab 5 mg  5 mg Oral BID    atorvastatin (Lipitor) tab 40 mg  40 mg Oral Nightly    carvedilol (Coreg) tab 3.125 mg  3.125 mg Oral BID with meals    miconazole 2 % powder   Topical PRN    glucose (Dex4) 15 GM/59ML oral liquid 15 g  15 g Oral Q15 Min PRN    Or    glucose (Glutose) 40% oral gel 15 g  15 g Oral Q15 Min PRN    Or    glucose-vitamin C (Dex-4) chewable tab 4 tablet  4 tablet Oral Q15 Min PRN    Or    dextrose 50% injection 50 mL  50 mL Intravenous Q15 Min PRN    Or    glucose (Dex4) 15 GM/59ML oral liquid 30 g  30 g Oral Q15 Min PRN    Or    glucose  Please use this number [phone] [facility] in [place] now will not be at work until Friday thank you.   (Glutose) 40% oral gel 30 g  30 g Oral Q15 Min PRN    Or    glucose-vitamin C (Dex-4) chewable tab 8 tablet  8 tablet Oral Q15 Min PRN    insulin aspart (NovoLOG) 100 Units/mL FlexPen 1-5 Units  1-5 Units Subcutaneous TID CC     Medications Prior to Admission   Medication Sig    carboxymethylcellulose 1 % Ophthalmic Gel Place 1 drop into both eyes 4 (four) times daily.    carvedilol 3.125 MG Oral Tab Take 1 tablet (3.125 mg total) by mouth 2 (two) times daily with meals.    cetirizine 10 MG Oral Tab Take 1 tablet (10 mg total) by mouth daily.    cholecalciferol 1000 UNITS Oral Cap Take 1 capsule (1,000 Units total) by mouth daily.    docusate sodium 100 MG Oral Cap Take 1 capsule (100 mg total) by mouth 2 (two) times daily.    Edoxaban Tosylate 60 MG Oral Tab Take 60 mg by mouth daily.    empagliflozin 25 MG Oral Tab Take 0.5 tablets (12.5 mg total) by mouth daily.    fluticasone-salmeterol 250-50 MCG/ACT Inhalation Aerosol Powder, Breath Activated Inhale 1 puff into the lungs 2 (two) times daily.    fluticasone propionate 50 MCG/ACT Nasal Suspension 2 sprays by Each Nare route daily.    Glucagon HCl (GLUCAGON EMERGENCY) 1 MG/ML Injection Recon Soln Inject 1 mg as directed one time.    insulin aspart 100 Units/mL Subcutaneous Solution Pen-injector Inject 8 Units into the skin daily with breakfast.    insulin aspart 100 Units/mL Subcutaneous Solution Pen-injector Inject 6 Units into the skin daily with dinner.    INSULIN GLARGINE MAX SOLOSTAR SC Inject 15 Units into the skin daily.    latanoprost 0.005 % Ophthalmic Solution Place 1 drop into both eyes nightly.    levothyroxine 125 MCG Oral Tab Take 1 tablet (125 mcg total) by mouth before breakfast.    Mirabegron ER 25 MG Oral Tablet 24 Hr Take 1 tablet (25 mg total) by mouth daily.    Multiple Vitamin (MULTIVITAMIN TAB/CAP) Take 1 tablet by mouth daily.    Nystatin Does not apply Powder     nystatin [id number] UNIT/GM External Powder Apply 1 Application topically 2  (two) times daily.    pantoprazole 40 MG Oral Tab EC Take 1 tablet (40 mg total) by mouth every morning before breakfast.    Potassium Chloride ER 10 MEQ Oral Tab CR Take 1 tablet (10 mEq total) by mouth daily.    primidone 50 MG Oral Tab Take 1 tablet (50 mg total) by mouth in the morning and 1 tablet (50 mg total) before bedtime.    rifAXIMin 550 MG Oral Tab Take 1 tablet (550 mg total) by mouth 2 (two) times daily.    semaglutide 8 MG/3ML Subcutaneous Solution Pen-injector Inject 2 mg into the skin once a week. On Saturdays    Sertraline HCl 200 MG Oral Cap Take 200 mg by mouth daily.    sodium chloride 0.65 % Nasal Solution 1 spray by Nasal route as needed for congestion.    tamsulosin 0.4 MG Oral Cap Take 1 capsule (0.4 mg total) by mouth at bedtime.    albuterol 108 (90 Base) MCG/ACT Inhalation Aero Soln Inhale 2 puffs into the lungs 4 (four) times daily as needed for Wheezing.    alendronate 70 MG Oral Tab Take 1 tablet (70 mg total) by mouth every 7 days.    atorvastatin 80 MG Oral Tab Take 1 tablet (80 mg total) by mouth daily.    benzonatate 100 MG Oral Cap Take 1 capsule (100 mg total) by mouth 3 (three) times daily as needed for cough.    carbamide peroxide 6.5 % Otic Solution Place 5 drops into both ears 3 (three) times a week at 1600.    clopidogrel 75 MG Oral Tab Take 1 tablet (75 mg total) by mouth daily.     No results found.         Pertinent diagnostic findings:  - WBC 3.58, hemoglobin 9.4, platelets 129  - High sensitivity troponin 132 (subsequent 181)  - D-dimer 1.64  - ProBNP 9711  - Procalcitonin 4.98  - CT chest: No pulmonary embolism, congestive heart failure with pulmonary interstitial edema, coronary disease s/p CABG, mildly dilated ascending aorta (4.5 cm), splenomegaly  - CXR: Cardiomegaly with pulmonary venous distension  - COVID, influenza A/B negative  - HbA1c 7.0  - EKG: Atrial fibrillation, nonspecific ST/T abnormalities     Assessment / Plan  1. Sepsis syndrome -  Presentation  concerning for sepsis rather than heart failure, given fever, cough, and elevated procalcitonin. Received IV fluids in ED with stabilization of BP and HR. Echocardiogram ordered and pending.     R65.20 Severe sepsis without septic shock     2. Possible superimposed congestive heart failure -  CXR with borderline heart failure changes and elevated BNP, but presentation more consistent with sepsis. Will hold off on further IV fluids at this time.  I50.9 Heart failure, unspecified     3. Elevated troponin with nonspecific ST/T changes -  Findings suggest type 2 myocardial injury in setting of sepsis syndrome and hypoxia. Patient has known coronary artery disease with 4 out of 5 bypass grafts occluded.  Patient has been treated medically. Patient voiced medical management only.     I21.A1 Myocardial infarction type 2     4. Coronary artery disease s/p CABG -  Patient with cardiac catheterization in December 2024 after a non-ST elevation myocardial infarction demonstrating 4 out of 5 grafts being closed and only 1 graft being open he thinks the artery that goes to the front part of the heart.     I25.10 Atherosclerotic heart disease of native coronary artery without angina pectoris     5. Atrial fibrillation -  Subsequent ECG demonstrating sinus rhythm. On edoxaban previously, can restart if available or substitute with Eliquis.  I48.91 Unspecified atrial fibrillation     6. Ischemic cardiomyopathy EF 30%. Outpatient discussion about ICD with his primary cardiologist. Recommended follow up 1 week post discharge.    PLAN:  Reviewed discussions from yesterday.  I also discussed about a defibrillator.  At this point this can be followed up outpatient.  He states that he wants to talk to his cardiologist at the [facility] to make any decisions like that.  He will follow-up with his cardiologist there.  Patient is also recovering right now from a febrile illness.  Continue same cardiac medications. DC plan for tomorrow, ok for  cardiology.

## 2025-01-14 NOTE — PROGRESS NOTES
[facility]  part of [facility]    Progress Note    Joshua TYLER Agusto Dean Patient Status:  Inpatient    1940 MRN [id number]   Location [facility] 3W/SW Attending Junaid Gill MD   Hosp Day # 3 PCP No primary care provider on file.       SUBJECTIVE:  Feeling better.  Denies any fever no chills no diarrhea.    OBJECTIVE:  Vital signs in last 24 hours:  BP 98/64 (BP Location: Left arm)   Pulse 60   Temp 98.5 °F (36.9 °C) (Oral)   Resp 20   Ht 5' 8\" (1.727 m)   Wt 179 lb (81.2 kg)   SpO2 96%   BMI 27.22 kg/m²     Intake/Output:    Intake/Output Summary (Last 24 hours) at 2025 0836  Last data filed at 2025  Gross per 24 hour   Intake 590 ml   Output 600 ml   Net -10 ml       Wt Readings from Last 3 Encounters:   25 179 lb (81.2 kg)       Exam  Gen: No acute distress, alert and oriented x3,  HEENT:  pallor noted no icterus  Pulm: Lungs clear, normal respiratory effort  CV: Heart with regular rate and rhythm, no peripheral edema  Abd: Abdomen soft, nontender, nondistended, no organomegaly, bowel sounds present  MSK: Full range of motion in extremities, no clubbing, no cyanosis  Skin: no rashes or lesions  CNS:  alert    Data Review:     Labs:          LABS  Recent Labs   Lab 25  1929 25  0716 25  0746   RBC 3.58* 3.30* 3.28*   HGB 9.4* 8.7* 8.7*   HCT 31.4* 29.7* 29.9*   MCV 87.7 90.0 91.2   MCH 26.3 26.4 26.5   MCHC 29.9* 29.3* 29.1*   RDW 15.2* 15.2* 15.4*   NEPRELIM 4.59 2.27 2.91   WBC 5.6 3.4* 5.1   .0* 110.0* 127.0*   PTT 32.6  --   --    DDIMER 1.64*  --   --    * 119* 103*       Imaging:      Meds:   Current Facility-Administered Medications   Medication Dose Route Frequency    levothyroxine (Synthroid) tab 125 mcg  125 mcg Oral Daily @ 0700    pantoprazole (Protonix) DR tab 40 mg  40 mg Oral QAM AC    ipratropium-albuterol (Duoneb) 0.5-2.5 (3) MG/3ML inhalation solution 3 mL  3 mL Nebulization Q4H PRN     acetaminophen (Tylenol) tab 650 mg  650 mg Oral Q6H PRN    cefTRIAXone (Rocephin) 2 g in sodium chloride 0.9% 100 mL IVPB-ADDV  2 g Intravenous Q24H    lisinopril (Zestril) tab 10 mg  10 mg Oral Daily    clopidogrel (Plavix) tab 75 mg  75 mg Oral Daily    apixaban (Eliquis) tab 5 mg  5 mg Oral BID    atorvastatin (Lipitor) tab 40 mg  40 mg Oral Nightly    carvedilol (Coreg) tab 3.125 mg  3.125 mg Oral BID with meals    miconazole 2 % powder   Topical PRN    glucose (Dex4) 15 GM/59ML oral liquid 15 g  15 g Oral Q15 Min PRN    Or    glucose (Glutose) 40% oral gel 15 g  15 g Oral Q15 Min PRN    Or    glucose-vitamin C (Dex-4) chewable tab 4 tablet  4 tablet Oral Q15 Min PRN    Or    dextrose 50% injection 50 mL  50 mL Intravenous Q15 Min PRN    Or    glucose (Dex4) 15 GM/59ML oral liquid 30 g  30 g Oral Q15 Min PRN    Or    glucose (Glutose) 40% oral gel 30 g  30 g Oral Q15 Min PRN    Or    glucose-vitamin C (Dex-4) chewable tab 8 tablet  8 tablet Oral Q15 Min PRN    insulin aspart (NovoLOG) 100 Units/mL FlexPen 1-5 Units  1-5 Units Subcutaneous TID CC       Assessment  Patient Active Problem List   Diagnosis    Acute febrile illness    Acute hypoxemic respiratory failure (HCC)    Elevated troponin    Acute febrile illness  Could be viral syndrome.    Chest x-ray does not reveal any pneumonia.    UA is also negative.     Will follow him clinically.    Patient was empirically started on ceftriaxone by the emergency room physician.  Will continue for now.       The       Acute hypoxemic respiratory failure (HCC)   Continue supportive care could be from pulmonary edema.            Elevated troponin  Could be from demand ischemia.    Also type 2 myocardial infarction.      Paroxysmal atrial fibrillation.  Continue current treatment.    Follow Cardiology recommendations.       Acute on chronic congestive heart failure with preserved ejection fraction  Patient was given Lasix.  Will follow him clinically.       Coronary  artery disease stable.       Nausea vomiting and diarrhea.   resolved  Most likely acute gastroenteritis could is Lucy a viral gastroenteritis could also be non infectious.       Patient is not clinically better.       Further management will depend on the clinical course of the patient   Discussed with the nursing staff discussed with ER physician       Plan:   As above  Active Orders   Nourishments    Room Service Eligibility Until Discontinued     Frequency: Until Discontinued     Number of Occurrences: Until Specified    Room Service Notify RN Until Discontinued     Frequency: Until Discontinued     Number of Occurrences: Until Specified   Diet    Cardiac diet Cardiac; Is Patient on Accuchecks? Yes     Frequency: Effective Now     Number of Occurrences: Until Specified   Nursing    Accucheck     Frequency: Once     Number of Occurrences: 1 Occurrences    Accucheck     Frequency: TID AC and HS     Number of Occurrences: Until Specified    Accucheck     Frequency: PRN     Number of Occurrences: Until Specified     Order Comments: For symptoms or suspicion of hypoglycemia      Cardiac monitoring     Frequency: Continuous     Number of Occurrences: 2 Hours     Order Comments: ED holding order only  Cancel if other admission orders exist!        Cardiac monitoring     Frequency: Until Discontinued     Number of Occurrences: Until Specified    Discontinue all oral diabetic agents     Frequency: Once     Number of Occurrences: 1 Occurrences    Hypoglycemia Protocol for Adults     Frequency: PRN     Number of Occurrences: Until Specified     Order Comments: blood glucose < 70      Initiate Hypoglycemia Algorithm for Adults     Frequency: Until Discontinued     Number of Occurrences: Until Specified     Order Comments: For blood glucose less than 70      Initiate Oxygen Protocol     Frequency: Until Discontinued     Number of Occurrences: 2 Hours    Notify physician (specify)     Frequency: Until Discontinued     Number  of Occurrences: Until Specified    Patient may continue to wear home continuous glucose monitor     Frequency: Until Discontinued     Number of Occurrences: Until Specified     Order Comments: Wear until discontinued or at recommended end of wear. Accu-Cheks must still be performed and results of Accu-Chek must be used for dosing all insulin and treatment of hypoglycemia.      Patient to remove home continuous glucose monitor prior to any imaging procedure     Frequency: Until Discontinued     Number of Occurrences: Until Specified     Order Comments: Patient must remove home continuous glucose monitor for:  1. MRI, CT, Nuclear Stress test, Pacemaker/AICD reprogramming, Therapeutic radiation - Oncology, Cardiac Cath, ERCP  2. X-ray - patient must remove any home continuous glucose monitor EXCEPT Dexcom unless in field of view  3. When removed, place CGM in ziplock bag with patient label and give to family member or store in a secure location - DO NOT THROW AWAY  4. Unless in area of imaging, may remain on for colonoscopy, EKG, laser surgery, ultrasound      Please communicate patient's home diabetic medications when speaking with physician     Frequency: Once     Number of Occurrences: 1 Occurrences    Prior to surgery, home continuous glucose monitor must be removed by the patient.     Frequency: Until Discontinued     Number of Occurrences: Until Specified    Provide diabetes patient education guide     Frequency: Once     Number of Occurrences: 1 Occurrences     Order Comments: Initiate education for new onset diabetes or pre-existing diabetes with knowledge deficits.      Pulse oximetry     Frequency: Continuous     Number of Occurrences: 2 Hours     Order Comments: ED holding order only  Cancel if other admission orders exist!        Teach blood glucose monitoring with bedside glucometer     Frequency: Once     Number of Occurrences: 1 Occurrences     Order Comments: If patient does not have a home glucometer,  notify physician to order for discharge home.      Vital signs     Frequency: Per Unit Routine     Number of Occurrences: 2 Hours     Order Comments: ED holding order only  Cancel if other admission orders exist!       Consult    ED Consult to Cardiology     Frequency: Once     Number of Occurrences: 1 Occurrences   Medications    acetaminophen (Tylenol) tab 650 mg     Frequency: Q6H PRN     Dose: 650 mg     Route: Oral    apixaban (Eliquis) tab 5 mg     Frequency: BID     Dose: 5 mg     Route: Oral    atorvastatin (Lipitor) tab 40 mg     Frequency: Nightly     Dose: 40 mg     Route: Oral    carvedilol (Coreg) tab 3.125 mg     Frequency: BID with meals     Dose: 3.125 mg     Route: Oral    cefTRIAXone (Rocephin) 2 g in sodium chloride 0.9% 100 mL IVPB-ADDV     Frequency: Q24H     Dose: 2 g     Route: Intravenous    clopidogrel (Plavix) tab 75 mg     Frequency: Daily     Dose: 75 mg     Route: Oral    dextrose 50% injection 50 mL     Linked Order: Or     Frequency: Q15 Min PRN     Dose: 50 mL     Route: Intravenous    glucose (Dex4) 15 GM/59ML oral liquid 15 g     Linked Order: Or     Frequency: Q15 Min PRN     Dose: 15 g     Route: Oral    glucose (Dex4) 15 GM/59ML oral liquid 30 g     Linked Order: Or     Frequency: Q15 Min PRN     Dose: 30 g     Route: Oral    glucose (Glutose) 40% oral gel 15 g     Linked Order: Or     Frequency: Q15 Min PRN     Dose: 15 g     Route: Oral    glucose (Glutose) 40% oral gel 30 g     Linked Order: Or     Frequency: Q15 Min PRN     Dose: 30 g     Route: Oral    glucose-vitamin C (Dex-4) chewable tab 4 tablet     Linked Order: Or     Frequency: Q15 Min PRN     Dose: 4 tablet     Route: Oral    glucose-vitamin C (Dex-4) chewable tab 8 tablet     Linked Order: Or     Frequency: Q15 Min PRN     Dose: 8 tablet     Route: Oral    insulin aspart (NovoLOG) 100 Units/mL FlexPen 1-5 Units     Frequency: TID CC     Dose: 1-5 Units     Route: Subcutaneous     Order Comments: For insulin aspart  (NovoLOG) doses > 60 Units, change product to dispense to be a insulin aspart (NovoLOG) *VIAL*    ipratropium-albuterol (Duoneb) 0.5-2.5 (3) MG/3ML inhalation solution 3 mL     Frequency: Q4H PRN     Dose: 3 mL     Route: Nebulization    levothyroxine (Synthroid) tab 125 mcg     Frequency: Daily @ 0700     Dose: 125 mcg     Route: Oral    lisinopril (Zestril) tab 10 mg     Frequency: Daily     Dose: 10 mg     Route: Oral    miconazole 2 % powder     Frequency: PRN     Route: Topical    pantoprazole (Protonix) DR tab 40 mg     Frequency: QAM AC     Dose: 40 mg     Route: Oral       Junaid Gill MD  1/13/2025

## 2025-01-14 NOTE — PLAN OF CARE
Problem: RESPIRATORY - ADULT  Goal: Achieves optimal ventilation and oxygenation  Description: INTERVENTIONS:  - Assess for changes in respiratory status  - Assess for changes in mentation and behavior  - Position to facilitate oxygenation and minimize respiratory effort  - Oxygen supplementation based on oxygen saturation or ABGs  - Provide Smoking Cessation handout, if applicable  - Encourage broncho-pulmonary hygiene including cough, deep breathe, Incentive Spirometry  - Assess the need for suctioning and perform as needed  - Assess and instruct to report SOB or any respiratory difficulty  - Respiratory Therapy support as indicated  - Manage/alleviate anxiety  - Monitor for signs/symptoms of CO2 retention  Outcome: Progressing     Problem: GASTROINTESTINAL - ADULT  Goal: Minimal or absence of nausea and vomiting  Description: INTERVENTIONS:  - Maintain adequate hydration with IV or PO as ordered and tolerated  - Nasogastric tube to low intermittent suction as ordered  - Evaluate effectiveness of ordered antiemetic medications  - Provide nonpharmacologic comfort measures as appropriate  - Advance diet as tolerated, if ordered  - Obtain nutritional consult as needed  - Evaluate fluid balance  Outcome: Progressing  Goal: Maintains or returns to baseline bowel function  Description: INTERVENTIONS:  - Assess bowel function  - Maintain adequate hydration with IV or PO as ordered and tolerated  - Evaluate effectiveness of GI medications  - Encourage mobilization and activity  - Obtain nutritional consult as needed  - Establish a toileting routine/schedule  - Consider collaborating with pharmacy to review patient's medication profile  Outcome: Progressing     Problem: METABOLIC/FLUID AND ELECTROLYTES - ADULT  Goal: Glucose maintained within prescribed range  Description: INTERVENTIONS:  - Monitor Blood Glucose as ordered  - Assess for signs and symptoms of hyperglycemia and hypoglycemia  - Administer ordered  medications to maintain glucose within target range  - Assess barriers to adequate nutritional intake and initiate nutrition consult as needed  - Instruct patient on self management of diabetes  Outcome: Progressing  Goal: Electrolytes maintained within normal limits  Description: INTERVENTIONS:  - Monitor labs and rhythm and assess patient for signs and symptoms of electrolyte imbalances  - Administer electrolyte replacement as ordered  - Monitor response to electrolyte replacements, including rhythm and repeat lab results as appropriate  - Fluid restriction as ordered  - Instruct patient on fluid and nutrition restrictions as appropriate  Outcome: Progressing

## 2025-01-15 VITALS
RESPIRATION RATE: 18 BRPM | HEIGHT: 68 IN | WEIGHT: 180.5 LBS | TEMPERATURE: 98 F | HEART RATE: 70 BPM | BODY MASS INDEX: 27.35 KG/M2 | SYSTOLIC BLOOD PRESSURE: 116 MMHG | DIASTOLIC BLOOD PRESSURE: 63 MMHG | OXYGEN SATURATION: 96 %

## 2025-01-15 LAB — GLUCOSE BLDC GLUCOMTR-MCNC: 134 MG/DL (ref 70–99)

## 2025-01-15 PROCEDURE — 82962 GLUCOSE BLOOD TEST: CPT

## 2025-01-15 RX ORDER — LISINOPRIL 10 MG/1
10 TABLET ORAL DAILY
Qty: 30 TABLET | Refills: 0 | Status: SHIPPED | OUTPATIENT
Start: 2025-01-16

## 2025-01-15 NOTE — PROGRESS NOTES
Patient denies any history of chest pain or shortness of breath. No abdominal pain or nausea. No headache. There is no cough or edema. Denies palpitations or dizziness. Has loose stools but better.    Vitals:    01/15/25 0533   BP: 112/64   Pulse: 65   Resp: 20   Temp: 98.6 °F (37 °C)       Intake/Output Summary (Last 24 hours) at 1/15/2025 0644  Last data filed at 1/15/2025 0153  Gross per 24 hour   Intake 910 ml   Output 250 ml   Net 660 ml     Wt Readings from Last 1 Encounters:   01/15/25 180 lb 8 oz (81.9 kg)        General: No acute distress.  Neck: Jugular venous pulsations not seen.  Lungs: Clear to auscultation.  Heart: Normal rate. HONEY aortic area.  Abdomen: Soft. Non tender  Extremities: No edema.  Neurological: Alert. No focal deficits.  Psychiatric: Appropriate mood and affect.  Current Facility-Administered Medications   Medication Dose Route Frequency    empagliflozin (Jardiance) tab 5 mg  5 mg Oral Daily    levothyroxine (Synthroid) tab 125 mcg  125 mcg Oral Daily @ 0700    pantoprazole (Protonix) DR tab 40 mg  40 mg Oral QAM AC    ipratropium-albuterol (Duoneb) 0.5-2.5 (3) MG/3ML inhalation solution 3 mL  3 mL Nebulization Q4H PRN    acetaminophen (Tylenol) tab 650 mg  650 mg Oral Q6H PRN    cefTRIAXone (Rocephin) 2 g in sodium chloride 0.9% 100 mL IVPB-ADDV  2 g Intravenous Q24H    lisinopril (Zestril) tab 10 mg  10 mg Oral Daily    clopidogrel (Plavix) tab 75 mg  75 mg Oral Daily    apixaban (Eliquis) tab 5 mg  5 mg Oral BID    atorvastatin (Lipitor) tab 40 mg  40 mg Oral Nightly    carvedilol (Coreg) tab 3.125 mg  3.125 mg Oral BID with meals    miconazole 2 % powder   Topical PRN    glucose (Dex4) 15 GM/59ML oral liquid 15 g  15 g Oral Q15 Min PRN    Or    glucose (Glutose) 40% oral gel 15 g  15 g Oral Q15 Min PRN    Or    glucose-vitamin C (Dex-4) chewable tab 4 tablet  4 tablet Oral Q15 Min PRN    Or    dextrose 50% injection 50 mL  50 mL Intravenous Q15 Min PRN    Or    glucose (Dex4) 15  GM/59ML oral liquid 30 g  30 g Oral Q15 Min PRN    Or    glucose (Glutose) 40% oral gel 30 g  30 g Oral Q15 Min PRN    Or    glucose-vitamin C (Dex-4) chewable tab 8 tablet  8 tablet Oral Q15 Min PRN    insulin aspart (NovoLOG) 100 Units/mL FlexPen 1-5 Units  1-5 Units Subcutaneous TID CC     Medications Prior to Admission   Medication Sig    carboxymethylcellulose 1 % Ophthalmic Gel Place 1 drop into both eyes 4 (four) times daily.    carvedilol 3.125 MG Oral Tab Take 1 tablet (3.125 mg total) by mouth 2 (two) times daily with meals.    cetirizine 10 MG Oral Tab Take 1 tablet (10 mg total) by mouth daily.    cholecalciferol 1000 UNITS Oral Cap Take 1 capsule (1,000 Units total) by mouth daily.    docusate sodium 100 MG Oral Cap Take 1 capsule (100 mg total) by mouth 2 (two) times daily.    Edoxaban Tosylate 60 MG Oral Tab Take 60 mg by mouth daily.    empagliflozin 25 MG Oral Tab Take 0.5 tablets (12.5 mg total) by mouth daily.    fluticasone-salmeterol 250-50 MCG/ACT Inhalation Aerosol Powder, Breath Activated Inhale 1 puff into the lungs 2 (two) times daily.    fluticasone propionate 50 MCG/ACT Nasal Suspension 2 sprays by Each Nare route daily.    Glucagon HCl (GLUCAGON EMERGENCY) 1 MG/ML Injection Recon Soln Inject 1 mg as directed one time.    insulin aspart 100 Units/mL Subcutaneous Solution Pen-injector Inject 8 Units into the skin daily with breakfast.    insulin aspart 100 Units/mL Subcutaneous Solution Pen-injector Inject 6 Units into the skin daily with dinner.    INSULIN GLARGINE MAX SOLOSTAR SC Inject 15 Units into the skin daily.    latanoprost 0.005 % Ophthalmic Solution Place 1 drop into both eyes nightly.    levothyroxine 125 MCG Oral Tab Take 1 tablet (125 mcg total) by mouth before breakfast.    Mirabegron ER 25 MG Oral Tablet 24 Hr Take 1 tablet (25 mg total) by mouth daily.    Multiple Vitamin (MULTIVITAMIN TAB/CAP) Take 1 tablet by mouth daily.    Nystatin Does not apply Powder     nystatin  [id number] UNIT/GM External Powder Apply 1 Application topically 2 (two) times daily.    pantoprazole 40 MG Oral Tab EC Take 1 tablet (40 mg total) by mouth every morning before breakfast.    Potassium Chloride ER 10 MEQ Oral Tab CR Take 1 tablet (10 mEq total) by mouth daily.    primidone 50 MG Oral Tab Take 1 tablet (50 mg total) by mouth in the morning and 1 tablet (50 mg total) before bedtime.    rifAXIMin 550 MG Oral Tab Take 1 tablet (550 mg total) by mouth 2 (two) times daily.    semaglutide 8 MG/3ML Subcutaneous Solution Pen-injector Inject 2 mg into the skin once a week. On Saturdays    Sertraline HCl 200 MG Oral Cap Take 200 mg by mouth daily.    sodium chloride 0.65 % Nasal Solution 1 spray by Nasal route as needed for congestion.    tamsulosin 0.4 MG Oral Cap Take 1 capsule (0.4 mg total) by mouth at bedtime.    albuterol 108 (90 Base) MCG/ACT Inhalation Aero Soln Inhale 2 puffs into the lungs 4 (four) times daily as needed for Wheezing.    alendronate 70 MG Oral Tab Take 1 tablet (70 mg total) by mouth every 7 days.    atorvastatin 80 MG Oral Tab Take 1 tablet (80 mg total) by mouth daily.    benzonatate 100 MG Oral Cap Take 1 capsule (100 mg total) by mouth 3 (three) times daily as needed for cough.    carbamide peroxide 6.5 % Otic Solution Place 5 drops into both ears 3 (three) times a week at 1600.    clopidogrel 75 MG Oral Tab Take 1 tablet (75 mg total) by mouth daily.     No results found.         Pertinent diagnostic findings:  - WBC 3.58, hemoglobin 9.4, platelets 129  - High sensitivity troponin 132 (subsequent 181)  - D-dimer 1.64  - ProBNP 9711  - Procalcitonin 4.98  - CT chest: No pulmonary embolism, congestive heart failure with pulmonary interstitial edema, coronary disease s/p CABG, mildly dilated ascending aorta (4.5 cm), splenomegaly  - CXR: Cardiomegaly with pulmonary venous distension  - COVID, influenza A/B negative  - HbA1c 7.0  - EKG: Atrial fibrillation, nonspecific ST/T  abnormalities     Assessment / Plan  1. Sepsis syndrome -  Presentation concerning for sepsis rather than heart failure, given fever, cough, and elevated procalcitonin. Received IV fluids in ED with stabilization of BP and HR. Echocardiogram ordered and pending.     R65.20 Severe sepsis without septic shock     2. Possible superimposed congestive heart failure -  CXR with borderline heart failure changes and elevated BNP, but presentation more consistent with sepsis. Will hold off on further IV fluids at this time.  I50.9 Heart failure, unspecified     3. Elevated troponin with nonspecific ST/T changes -  Findings suggest type 2 myocardial injury in setting of sepsis syndrome and hypoxia. Patient has known coronary artery disease with 4 out of 5 bypass grafts occluded.  Patient has been treated medically. Patient voiced medical management only.     I21.A1 Myocardial infarction type 2     4. Coronary artery disease s/p CABG -  Patient with cardiac catheterization in December 2024 after a non-ST elevation myocardial infarction demonstrating 4 out of 5 grafts being closed and only 1 graft being open he thinks the artery that goes to the front part of the heart.     I25.10 Atherosclerotic heart disease of native coronary artery without angina pectoris     5. Atrial fibrillation -  Subsequent ECG demonstrating sinus rhythm. On edoxaban previously, can restart if available or substitute with Eliquis.  I48.91 Unspecified atrial fibrillation     6. Ischemic cardiomyopathy EF 30%. Outpatient discussion about ICD with his primary cardiologist. Recommended follow up 1-2 week post discharge with cardiologist. Discussed with patient. ICD discussion with his cardiologist, as per patient wishes. [facility], none received after 4 days for EF. Currently no urgent indication for ICD. Recently recovering from febrile illness and unknown chronicity of diagnosis.    PLAN:  Reviewed cardiac meds. Resumed Jardiance 5 mg  daily, home dose. Recommend Entresto discussion with his cardiologist outpatient, discussed with patient and DC instructions entered. OK to dc.

## 2025-01-15 NOTE — PLAN OF CARE
Problem: Patient Centered Care  Goal: Patient preferences are identified and integrated in the patient's plan of care  Description: Interventions:  - What would you like us to know as we care for you? Patient is from [facility].  - Provide timely, complete, and accurate information to patient/family  - Incorporate patient and family knowledge, values, beliefs, and cultural backgrounds into the planning and delivery of care  - Encourage patient/family to participate in care and decision-making at the level they choose  - Honor patient and family perspectives and choices  Outcome: Adequate for Discharge     Problem: Patient/Family Goals  Goal: Patient/Family Long Term Goal  Description: Patient's Long Term Goal: stay out of the hospital    Interventions:  - ensure medication compliance  - follow up with MD  - See additional Care Plan goals for specific interventions  Outcome: Adequate for Discharge  Goal: Patient/Family Short Term Goal  Description: Patient's Short Term Goal: feel better    Interventions:   - monitor vitals and temp  - iv antibiotics per md order  - See additional Care Plan goals for specific interventions  Outcome: Adequate for Discharge     Problem: RESPIRATORY - ADULT  Goal: Achieves optimal ventilation and oxygenation  Description: INTERVENTIONS:  - Assess for changes in respiratory status  - Assess for changes in mentation and behavior  - Position to facilitate oxygenation and minimize respiratory effort  - Oxygen supplementation based on oxygen saturation or ABGs  - Provide Smoking Cessation handout, if applicable  - Encourage broncho-pulmonary hygiene including cough, deep breathe, Incentive Spirometry  - Assess the need for suctioning and perform as needed  - Assess and instruct to report SOB or any respiratory difficulty  - Respiratory Therapy support as indicated  - Manage/alleviate anxiety  - Monitor for signs/symptoms of CO2 retention  Outcome: Adequate for Discharge      Problem: GASTROINTESTINAL - ADULT  Goal: Minimal or absence of nausea and vomiting  Description: INTERVENTIONS:  - Maintain adequate hydration with IV or PO as ordered and tolerated  - Nasogastric tube to low intermittent suction as ordered  - Evaluate effectiveness of ordered antiemetic medications  - Provide nonpharmacologic comfort measures as appropriate  - Advance diet as tolerated, if ordered  - Obtain nutritional consult as needed  - Evaluate fluid balance  Outcome: Adequate for Discharge  Goal: Maintains or returns to baseline bowel function  Description: INTERVENTIONS:  - Assess bowel function  - Maintain adequate hydration with IV or PO as ordered and tolerated  - Evaluate effectiveness of GI medications  - Encourage mobilization and activity  - Obtain nutritional consult as needed  - Establish a toileting routine/schedule  - Consider collaborating with pharmacy to review patient's medication profile  Outcome: Adequate for Discharge     Problem: METABOLIC/FLUID AND ELECTROLYTES - ADULT  Goal: Glucose maintained within prescribed range  Description: INTERVENTIONS:  - Monitor Blood Glucose as ordered  - Assess for signs and symptoms of hyperglycemia and hypoglycemia  - Administer ordered medications to maintain glucose within target range  - Assess barriers to adequate nutritional intake and initiate nutrition consult as needed  - Instruct patient on self management of diabetes  Outcome: Adequate for Discharge  Goal: Electrolytes maintained within normal limits  Description: INTERVENTIONS:  - Monitor labs and rhythm and assess patient for signs and symptoms of electrolyte imbalances  - Administer electrolyte replacement as ordered  - Monitor response to electrolyte replacements, including rhythm and repeat lab results as appropriate  - Fluid restriction as ordered  - Instruct patient on fluid and nutrition restrictions as appropriate  Outcome: Adequate for Discharge

## 2025-01-15 NOTE — PROGRESS NOTES
[facility]  part of [facility]    Progress Note    Joshua Liz . Patient Status:  Inpatient    1940 MRN [id number]   Location [facility] 3W/SW Attending Junaid Gill MD   Hosp Day # 4 PCP No primary care provider on file.       SUBJECTIVE:   I need to get out of here Denies any fever no chills no diarrhea.       The patient wants be discharged as soon as possible.  OBJECTIVE:  Vital signs in last 24 hours:  /64 (BP Location: Left arm)   Pulse 65   Temp 98.6 °F (37 °C) (Oral)   Resp 20   Ht 5' 8\" (1.727 m)   Wt 180 lb 8 oz (81.9 kg)   SpO2 98%   BMI 27.44 kg/m²     Intake/Output:    Intake/Output Summary (Last 24 hours) at 1/15/2025 0816  Last data filed at 1/15/2025 0153  Gross per 24 hour   Intake 910 ml   Output 250 ml   Net 660 ml       Wt Readings from Last 3 Encounters:   01/15/25 180 lb 8 oz (81.9 kg)       Exam    Per nursing staff  Gen: No acute distress, alert and oriented x3,  HEENT:  pallor noted no icterus  Pulm: Lungs clear, normal respiratory effort  CV: Heart with regular rate and rhythm, no peripheral edema  Abd: Abdomen soft, nontender, nondistended, no organomegaly, bowel sounds present  MSK: Full range of motion in extremities, no clubbing, no cyanosis  Skin: no rashes or lesions  CNS:  alert    Data Review:     Labs:          LABS  Recent Labs   Lab 25  1929 25  0716 25  0746   RBC 3.58* 3.30* 3.28*   HGB 9.4* 8.7* 8.7*   HCT 31.4* 29.7* 29.9*   MCV 87.7 90.0 91.2   MCH 26.3 26.4 26.5   MCHC 29.9* 29.3* 29.1*   RDW 15.2* 15.2* 15.4*   NEPRELIM 4.59 2.27 2.91   WBC 5.6 3.4* 5.1   .0* 110.0* 127.0*   PTT 32.6  --   --    DDIMER 1.64*  --   --    * 119* 103*       Imaging:      Meds:   Current Facility-Administered Medications   Medication Dose Route Frequency    empagliflozin (Jardiance) tab 5 mg  5 mg Oral Daily    levothyroxine (Synthroid) tab 125 mcg  125 mcg Oral Daily @ 0700    pantoprazole  (Protonix) DR tab 40 mg  40 mg Oral QAM AC    ipratropium-albuterol (Duoneb) 0.5-2.5 (3) MG/3ML inhalation solution 3 mL  3 mL Nebulization Q4H PRN    acetaminophen (Tylenol) tab 650 mg  650 mg Oral Q6H PRN    cefTRIAXone (Rocephin) 2 g in sodium chloride 0.9% 100 mL IVPB-ADDV  2 g Intravenous Q24H    lisinopril (Zestril) tab 10 mg  10 mg Oral Daily    clopidogrel (Plavix) tab 75 mg  75 mg Oral Daily    apixaban (Eliquis) tab 5 mg  5 mg Oral BID    atorvastatin (Lipitor) tab 40 mg  40 mg Oral Nightly    carvedilol (Coreg) tab 3.125 mg  3.125 mg Oral BID with meals    miconazole 2 % powder   Topical PRN    glucose (Dex4) 15 GM/59ML oral liquid 15 g  15 g Oral Q15 Min PRN    Or    glucose (Glutose) 40% oral gel 15 g  15 g Oral Q15 Min PRN    Or    glucose-vitamin C (Dex-4) chewable tab 4 tablet  4 tablet Oral Q15 Min PRN    Or    dextrose 50% injection 50 mL  50 mL Intravenous Q15 Min PRN    Or    glucose (Dex4) 15 GM/59ML oral liquid 30 g  30 g Oral Q15 Min PRN    Or    glucose (Glutose) 40% oral gel 30 g  30 g Oral Q15 Min PRN    Or    glucose-vitamin C (Dex-4) chewable tab 8 tablet  8 tablet Oral Q15 Min PRN    insulin aspart (NovoLOG) 100 Units/mL FlexPen 1-5 Units  1-5 Units Subcutaneous TID CC       Assessment  Patient Active Problem List   Diagnosis    Acute febrile illness    Acute hypoxemic respiratory failure (HCC)    Elevated troponin    Acute febrile illness  Could be viral syndrome.    Chest x-ray does not reveal any pneumonia.    UA is also negative.     Will follow him clinically.    Patient was empirically started on ceftriaxone by the emergency room physician.  Will continue for now.        Blood cultures negative for 3 days       Acute hypoxemic respiratory failure (HCC)   Continue supportive care could be from pulmonary edema.     resolved       Elevated troponin  Could be from demand ischemia.    Also type 2 myocardial infarction.      Paroxysmal atrial fibrillation.  Continue current treatment.     Follow Cardiology recommendations.       Acute on chronic congestive heart failure with preserved ejection fraction  Patient was given Lasix.  Will follow him clinically.       Coronary artery disease stable.       Nausea vomiting and diarrhea.   resolved  Most likely acute gastroenteritis could is Lucy a viral gastroenteritis could also be non infectious.       Patient is not clinically better.       Further management will depend on the clinical course of the patient    DC home today       Plan:   As above  Active Orders   Nourishments    Room Service Eligibility Until Discontinued     Frequency: Until Discontinued     Number of Occurrences: Until Specified    Room Service Notify RN Until Discontinued     Frequency: Until Discontinued     Number of Occurrences: Until Specified   Diet    Cardiac diet Cardiac; Is Patient on Accuchecks? Yes     Frequency: Effective Now     Number of Occurrences: Until Specified   Nursing    Accucheck     Frequency: Once     Number of Occurrences: 1 Occurrences    Accucheck     Frequency: TID AC and HS     Number of Occurrences: Until Specified    Accucheck     Frequency: PRN     Number of Occurrences: Until Specified     Order Comments: For symptoms or suspicion of hypoglycemia      Cardiac monitoring     Frequency: Continuous     Number of Occurrences: 2 Hours     Order Comments: ED holding order only  Cancel if other admission orders exist!        Cardiac monitoring     Frequency: Until Discontinued     Number of Occurrences: Until Specified    Discontinue all oral diabetic agents     Frequency: Once     Number of Occurrences: 1 Occurrences    Hypoglycemia Protocol for Adults     Frequency: PRN     Number of Occurrences: Until Specified     Order Comments: blood glucose < 70      Initiate Hypoglycemia Algorithm for Adults     Frequency: Until Discontinued     Number of Occurrences: Until Specified     Order Comments: For blood glucose less than 70      Initiate Oxygen Protocol      Frequency: Until Discontinued     Number of Occurrences: 2 Hours    Notify physician (specify)     Frequency: Until Discontinued     Number of Occurrences: Until Specified    Patient may continue to wear home continuous glucose monitor     Frequency: Until Discontinued     Number of Occurrences: Until Specified     Order Comments: Wear until discontinued or at recommended end of wear. Accu-Cheks must still be performed and results of Accu-Chek must be used for dosing all insulin and treatment of hypoglycemia.      Patient to remove home continuous glucose monitor prior to any imaging procedure     Frequency: Until Discontinued     Number of Occurrences: Until Specified     Order Comments: Patient must remove home continuous glucose monitor for:  1. MRI, CT, Nuclear Stress test, Pacemaker/AICD reprogramming, Therapeutic radiation - Oncology, Cardiac Cath, ERCP  2. X-ray - patient must remove any home continuous glucose monitor EXCEPT Dexcom unless in field of view  3. When removed, place CGM in ziplock bag with patient label and give to family member or store in a secure location - DO NOT THROW AWAY  4. Unless in area of imaging, may remain on for colonoscopy, EKG, laser surgery, ultrasound      Please communicate patient's home diabetic medications when speaking with physician     Frequency: Once     Number of Occurrences: 1 Occurrences    Prior to surgery, home continuous glucose monitor must be removed by the patient.     Frequency: Until Discontinued     Number of Occurrences: Until Specified    Provide diabetes patient education guide     Frequency: Once     Number of Occurrences: 1 Occurrences     Order Comments: Initiate education for new onset diabetes or pre-existing diabetes with knowledge deficits.      Pulse oximetry     Frequency: Continuous     Number of Occurrences: 2 Hours     Order Comments: ED holding order only  Cancel if other admission orders exist!        Teach blood glucose monitoring with  bedside glucometer     Frequency: Once     Number of Occurrences: 1 Occurrences     Order Comments: If patient does not have a home glucometer, notify physician to order for discharge home.      Vital signs     Frequency: Per Unit Routine     Number of Occurrences: 2 Hours     Order Comments: ED holding order only  Cancel if other admission orders exist!       Consult    Consult to Cardiac Rehab     Frequency: Once     Number of Occurrences: 1 Occurrences    ED Consult to Cardiology     Frequency: Once     Number of Occurrences: 1 Occurrences   Medications    acetaminophen (Tylenol) tab 650 mg     Frequency: Q6H PRN     Dose: 650 mg     Route: Oral    apixaban (Eliquis) tab 5 mg     Frequency: BID     Dose: 5 mg     Route: Oral    atorvastatin (Lipitor) tab 40 mg     Frequency: Nightly     Dose: 40 mg     Route: Oral    carvedilol (Coreg) tab 3.125 mg     Frequency: BID with meals     Dose: 3.125 mg     Route: Oral    cefTRIAXone (Rocephin) 2 g in sodium chloride 0.9% 100 mL IVPB-ADDV     Frequency: Q24H     Dose: 2 g     Route: Intravenous    clopidogrel (Plavix) tab 75 mg     Frequency: Daily     Dose: 75 mg     Route: Oral    dextrose 50% injection 50 mL     Linked Order: Or     Frequency: Q15 Min PRN     Dose: 50 mL     Route: Intravenous    empagliflozin (Jardiance) tab 5 mg     Frequency: Daily     Dose: 5 mg     Route: Oral    glucose (Dex4) 15 GM/59ML oral liquid 15 g     Linked Order: Or     Frequency: Q15 Min PRN     Dose: 15 g     Route: Oral    glucose (Dex4) 15 GM/59ML oral liquid 30 g     Linked Order: Or     Frequency: Q15 Min PRN     Dose: 30 g     Route: Oral    glucose (Glutose) 40% oral gel 15 g     Linked Order: Or     Frequency: Q15 Min PRN     Dose: 15 g     Route: Oral    glucose (Glutose) 40% oral gel 30 g     Linked Order: Or     Frequency: Q15 Min PRN     Dose: 30 g     Route: Oral    glucose-vitamin C (Dex-4) chewable tab 4 tablet     Linked Order: Or     Frequency: Q15 Min PRN     Dose: 4  tablet     Route: Oral    glucose-vitamin C (Dex-4) chewable tab 8 tablet     Linked Order: Or     Frequency: Q15 Min PRN     Dose: 8 tablet     Route: Oral    insulin aspart (NovoLOG) 100 Units/mL FlexPen 1-5 Units     Frequency: TID CC     Dose: 1-5 Units     Route: Subcutaneous     Order Comments: For insulin aspart (NovoLOG) doses > 60 Units, change product to dispense to be a insulin aspart (NovoLOG) *VIAL*    ipratropium-albuterol (Duoneb) 0.5-2.5 (3) MG/3ML inhalation solution 3 mL     Frequency: Q4H PRN     Dose: 3 mL     Route: Nebulization    levothyroxine (Synthroid) tab 125 mcg     Frequency: Daily @ 0700     Dose: 125 mcg     Route: Oral    lisinopril (Zestril) tab 10 mg     Frequency: Daily     Dose: 10 mg     Route: Oral    miconazole 2 % powder     Frequency: PRN     Route: Topical    pantoprazole (Protonix) DR tab 40 mg     Frequency: QAM AC     Dose: 40 mg     Route: Oral       Junaid Gill MD  1/13/2025

## 2025-01-15 NOTE — DISCHARGE SUMMARY
[facility]  part of [facility]    Discharge Summary    Joshua Liz Sr. Patient Status:  Inpatient    1940 MRN [id number]   Location [facility] 3W/SW Attending Junaid Gill MD   Hosp Day # 4 PCP No primary care provider on file.                Date of Admission: 2025   Date of Discharge: 1/15/2025    Admitting Diagnosis: Elevated troponin [R79.89]  Acute febrile illness [R50.9]  Acute hypoxemic respiratory failure (HCC) [J96.01]    Disposition: Home    Discharge Diagnosis: .Principal Problem:    Acute febrile illness  Active Problems:    Acute hypoxemic respiratory failure (HCC)    Elevated troponin      Hospital Course:   Reason for Admission:  vomiting and diarrhea.        Discharge Physical Exam:  Vital Signs:  Blood pressure 116/63, pulse 70, temperature 98.3 °F (36.8 °C), temperature source Oral, resp. rate 18, height 5' 8\" (1.727 m), weight 180 lb 8 oz (81.9 kg), SpO2 96%.     General: No acute distress. Alert and oriented x 3.  HEENT: Moist mucous membranes. EOM-I. PERRL  Neck: No lymphadenopathy.  No JVD. No carotid bruits.  Respiratory: Clear to auscultation bilaterally.  No wheezes. No rhonchi.  Cardiovascular: S1, S2.  Regular rate and rhythm.  No murmurs. Equal pulses   Abdomen: Soft, nontender, nondistended.  Positive bowel sounds. No rebound tenderness  Neurologic: No focal neurological deficits.  Musculoskeletal: Full range of motion of all extremities.  No swelling noted.  Integument: No lesions. No erythema.  Psychiatric: Appropriate mood and affect.    Hospital Course: Joshua Liz Sr. is a(n) 84 year old male. COPD, diabetes mellitus type 2, hypertension, hyperlipidemia, peripheral neuropathy,  Coronary artery disease on medical therapy, atrial fibrillation on anticoagulation who was in his usual state of health about 2 weeks ago the patient started having vomiting and the diarrhea.  Patient was also recently diagnosed with  COVID-19 about 2  weeks ago.  He is also exposed to RSV virus his wife is having infection with RSV,   The patient denies any cough.  Patient denies any urinary symptoms.  No burning.   There was no coffee-ground emesis.    There is no doctor is stools.    Patient denies any abdominal pain.    Patient came to the emergency room.    The patient was also having fever.    Patient is feeling much better now.    Patient denies any abdominal pain no nausea no vomiting at the time of the examination.  The patient was admitted to the hospital   Patient was seen by the cardiologist  The patient medications were adjusted.  Today the patient is doing fine.  And it was decided to discharge him home.  Patient has been cleared for discharge also patient is also eager to go home.    Patient is stable.  Discussed with the nursing staff.  Follow-up instructions given      Complications:     Consultants         Provider   Role Specialty     Rashid Espinoza,       Consulting Physician Cardiovascular Diseases            Pending Labs       Order Current Status    Blood Culture Preliminary result    Blood Culture Preliminary result            Discharge Plan:   Discharge Condition: Good    Current Discharge Medication List        New Orders    Details   lisinopril 10 MG Oral Tab Take 1 tablet (10 mg total) by mouth daily.           Home Meds - Unchanged    Details   carboxymethylcellulose 1 % Ophthalmic Gel Place 1 drop into both eyes 4 (four) times daily.      carvedilol 3.125 MG Oral Tab Take 1 tablet (3.125 mg total) by mouth 2 (two) times daily with meals.      cetirizine 10 MG Oral Tab Take 1 tablet (10 mg total) by mouth daily.      cholecalciferol 1000 UNITS Oral Cap Take 1 capsule (1,000 Units total) by mouth daily.      docusate sodium 100 MG Oral Cap Take 1 capsule (100 mg total) by mouth 2 (two) times daily.      Edoxaban Tosylate 60 MG Oral Tab Take 60 mg by mouth daily.      empagliflozin 25 MG Oral Tab Take 0.5 tablets (12.5 mg total) by  mouth daily.      fluticasone-salmeterol 250-50 MCG/ACT Inhalation Aerosol Powder, Breath Activated Inhale 1 puff into the lungs 2 (two) times daily.      fluticasone propionate 50 MCG/ACT Nasal Suspension 2 sprays by Each Nare route daily.      Glucagon HCl (GLUCAGON EMERGENCY) 1 MG/ML Injection Recon Soln Inject 1 mg as directed one time.      !! insulin aspart 100 Units/mL Subcutaneous Solution Pen-injector Inject 8 Units into the skin daily with breakfast.      !! insulin aspart 100 Units/mL Subcutaneous Solution Pen-injector Inject 6 Units into the skin daily with dinner.      INSULIN GLARGINE MAX SOLOSTAR SC Inject 15 Units into the skin daily.      latanoprost 0.005 % Ophthalmic Solution Place 1 drop into both eyes nightly.      levothyroxine 125 MCG Oral Tab Take 1 tablet (125 mcg total) by mouth before breakfast.      Mirabegron ER 25 MG Oral Tablet 24 Hr Take 1 tablet (25 mg total) by mouth daily.      Multiple Vitamin (MULTIVITAMIN TAB/CAP) Take 1 tablet by mouth daily.      Nystatin Does not apply Powder       nystatin 392864 UNIT/GM External Powder Apply 1 Application topically 2 (two) times daily.      pantoprazole 40 MG Oral Tab EC Take 1 tablet (40 mg total) by mouth every morning before breakfast.      Potassium Chloride ER 10 MEQ Oral Tab CR Take 1 tablet (10 mEq total) by mouth daily.      primidone 50 MG Oral Tab Take 1 tablet (50 mg total) by mouth in the morning and 1 tablet (50 mg total) before bedtime.      rifAXIMin 550 MG Oral Tab Take 1 tablet (550 mg total) by mouth 2 (two) times daily.      semaglutide 8 MG/3ML Subcutaneous Solution Pen-injector Inject 2 mg into the skin once a week. On Saturdays      Sertraline HCl 200 MG Oral Cap Take 200 mg by mouth daily.      sodium chloride 0.65 % Nasal Solution 1 spray by Nasal route as needed for congestion.      tamsulosin 0.4 MG Oral Cap Take 1 capsule (0.4 mg total) by mouth at bedtime.      albuterol 108 (90 Base) MCG/ACT Inhalation Aero Soln  Inhale 2 puffs into the lungs 4 (four) times daily as needed for Wheezing.      alendronate 70 MG Oral Tab Take 1 tablet (70 mg total) by mouth every 7 days.      atorvastatin 80 MG Oral Tab Take 1 tablet (80 mg total) by mouth daily.      benzonatate 100 MG Oral Cap Take 1 capsule (100 mg total) by mouth 3 (three) times daily as needed for cough.      carbamide peroxide 6.5 % Otic Solution Place 5 drops into both ears 3 (three) times a week at 1600.      clopidogrel 75 MG Oral Tab Take 1 tablet (75 mg total) by mouth daily.       !! - Potential duplicate medications found. Please discuss with provider.              Discharge Diet: Cardiac diet      Discharge Activity:     Follow up:       Follow up Labs        Other Discharge Instructions:         Follow up with your cardiologist in 1-2 weeks to discuss about ICD.  Discuss about switching lisinopril to Entresto also.     Follow-up with primary care physician in 1-2 weeks.    Call MD or come to emergency room for any fever chills chest pain shortness of breath or any new symptoms      Junaid Gill MD   1/15/2025

## 2025-01-15 NOTE — PLAN OF CARE
Problem: Patient Centered Care  Goal: Patient preferences are identified and integrated in the patient's plan of care  Description: Interventions:  - What would you like us to know as we care for you? Patient is from [facility].  - Provide timely, complete, and accurate information to patient/family  - Incorporate patient and family knowledge, values, beliefs, and cultural backgrounds into the planning and delivery of care  - Encourage patient/family to participate in care and decision-making at the level they choose  - Honor patient and family perspectives and choices  Outcome: Progressing     Problem: Patient/Family Goals  Goal: Patient/Family Long Term Goal  Description: Patient's Long Term Goal: stay out of the hospital    Interventions:  - ensure medication compliance  - follow up with MD  - See additional Care Plan goals for specific interventions  Outcome: Progressing  Goal: Patient/Family Short Term Goal  Description: Patient's Short Term Goal: feel better    Interventions:   - monitor vitals and temp  - iv antibiotics per md order  - See additional Care Plan goals for specific interventions  Outcome: Progressing     Problem: RESPIRATORY - ADULT  Goal: Achieves optimal ventilation and oxygenation  Description: INTERVENTIONS:  - Assess for changes in respiratory status  - Assess for changes in mentation and behavior  - Position to facilitate oxygenation and minimize respiratory effort  - Oxygen supplementation based on oxygen saturation or ABGs  - Provide Smoking Cessation handout, if applicable  - Encourage broncho-pulmonary hygiene including cough, deep breathe, Incentive Spirometry  - Assess the need for suctioning and perform as needed  - Assess and instruct to report SOB or any respiratory difficulty  - Respiratory Therapy support as indicated  - Manage/alleviate anxiety  - Monitor for signs/symptoms of CO2 retention  Outcome: Progressing     Problem: GASTROINTESTINAL - ADULT  Goal:  Minimal or absence of nausea and vomiting  Description: INTERVENTIONS:  - Maintain adequate hydration with IV or PO as ordered and tolerated  - Nasogastric tube to low intermittent suction as ordered  - Evaluate effectiveness of ordered antiemetic medications  - Provide nonpharmacologic comfort measures as appropriate  - Advance diet as tolerated, if ordered  - Obtain nutritional consult as needed  - Evaluate fluid balance  Outcome: Progressing  Goal: Maintains or returns to baseline bowel function  Description: INTERVENTIONS:  - Assess bowel function  - Maintain adequate hydration with IV or PO as ordered and tolerated  - Evaluate effectiveness of GI medications  - Encourage mobilization and activity  - Obtain nutritional consult as needed  - Establish a toileting routine/schedule  - Consider collaborating with pharmacy to review patient's medication profile  Outcome: Progressing     Problem: METABOLIC/FLUID AND ELECTROLYTES - ADULT  Goal: Glucose maintained within prescribed range  Description: INTERVENTIONS:  - Monitor Blood Glucose as ordered  - Assess for signs and symptoms of hyperglycemia and hypoglycemia  - Administer ordered medications to maintain glucose within target range  - Assess barriers to adequate nutritional intake and initiate nutrition consult as needed  - Instruct patient on self management of diabetes  Outcome: Progressing  Goal: Electrolytes maintained within normal limits  Description: INTERVENTIONS:  - Monitor labs and rhythm and assess patient for signs and symptoms of electrolyte imbalances  - Administer electrolyte replacement as ordered  - Monitor response to electrolyte replacements, including rhythm and repeat lab results as appropriate  - Fluid restriction as ordered  - Instruct patient on fluid and nutrition restrictions as appropriate  Outcome: Progressing

## 2025-01-15 NOTE — PLAN OF CARE
Problem: Patient Centered Care  Goal: Patient preferences are identified and integrated in the patient's plan of care  Description: Interventions:  - What would you like us to know as we care for you? Patient is from [facility].  - Provide timely, complete, and accurate information to patient/family  - Incorporate patient and family knowledge, values, beliefs, and cultural backgrounds into the planning and delivery of care  - Encourage patient/family to participate in care and decision-making at the level they choose  - Honor patient and family perspectives and choices  Outcome: Progressing     Problem: Patient/Family Goals  Goal: Patient/Family Long Term Goal  Description: Patient's Long Term Goal: stay out of the hospital    Interventions:  - ensure medication compliance  - follow up with MD  - See additional Care Plan goals for specific interventions  Outcome: Progressing  Goal: Patient/Family Short Term Goal  Description: Patient's Short Term Goal: feel better    Interventions:   - monitor vitals and temp  - iv antibiotics per md order  - See additional Care Plan goals for specific interventions  Outcome: Progressing     Problem: RESPIRATORY - ADULT  Goal: Achieves optimal ventilation and oxygenation  Description: INTERVENTIONS:  - Assess for changes in respiratory status  - Assess for changes in mentation and behavior  - Position to facilitate oxygenation and minimize respiratory effort  - Oxygen supplementation based on oxygen saturation or ABGs  - Provide Smoking Cessation handout, if applicable  - Encourage broncho-pulmonary hygiene including cough, deep breathe, Incentive Spirometry  - Assess the need for suctioning and perform as needed  - Assess and instruct to report SOB or any respiratory difficulty  - Respiratory Therapy support as indicated  - Manage/alleviate anxiety  - Monitor for signs/symptoms of CO2 retention  Outcome: Progressing     Problem: GASTROINTESTINAL - ADULT  Goal:  Minimal or absence of nausea and vomiting  Description: INTERVENTIONS:  - Maintain adequate hydration with IV or PO as ordered and tolerated  - Nasogastric tube to low intermittent suction as ordered  - Evaluate effectiveness of ordered antiemetic medications  - Provide nonpharmacologic comfort measures as appropriate  - Advance diet as tolerated, if ordered  - Obtain nutritional consult as needed  - Evaluate fluid balance  Outcome: Progressing  Goal: Maintains or returns to baseline bowel function  Description: INTERVENTIONS:  - Assess bowel function  - Maintain adequate hydration with IV or PO as ordered and tolerated  - Evaluate effectiveness of GI medications  - Encourage mobilization and activity  - Obtain nutritional consult as needed  - Establish a toileting routine/schedule  - Consider collaborating with pharmacy to review patient's medication profile  Outcome: Progressing     Problem: METABOLIC/FLUID AND ELECTROLYTES - ADULT  Goal: Glucose maintained within prescribed range  Description: INTERVENTIONS:  - Monitor Blood Glucose as ordered  - Assess for signs and symptoms of hyperglycemia and hypoglycemia  - Administer ordered medications to maintain glucose within target range  - Assess barriers to adequate nutritional intake and initiate nutrition consult as needed  - Instruct patient on self management of diabetes  Outcome: Progressing  Goal: Electrolytes maintained within normal limits  Description: INTERVENTIONS:  - Monitor labs and rhythm and assess patient for signs and symptoms of electrolyte imbalances  - Administer electrolyte replacement as ordered  - Monitor response to electrolyte replacements, including rhythm and repeat lab results as appropriate  - Fluid restriction as ordered  - Instruct patient on fluid and nutrition restrictions as appropriate  Outcome: Progressing

## 2025-01-16 ENCOUNTER — PATIENT OUTREACH (OUTPATIENT)
Dept: CASE MANAGEMENT | Age: 85
End: 2025-01-16

## 2025-01-16 NOTE — PROGRESS NOTES
TCM request (discharged 01/15)    Primary Care Physician    Attempt #1:  Unable to contact patient (phone rang several times then busy signal)

## 2025-01-20 NOTE — PROGRESS NOTES
TCM reached out for scheduling assistance.    No PCP.    Attempt #3:  Call rang until busy, unable to leave a message. Closing encounter. Will re-open if patient returns call.

## 2025-01-27 NOTE — PROGRESS NOTES
Provider Clarification     Additional information on the status of the infection.     Sepsis ruled out    This note is part of the patient's medical record.